# Patient Record
Sex: MALE | Race: WHITE | NOT HISPANIC OR LATINO | Employment: STUDENT | URBAN - NONMETROPOLITAN AREA
[De-identification: names, ages, dates, MRNs, and addresses within clinical notes are randomized per-mention and may not be internally consistent; named-entity substitution may affect disease eponyms.]

---

## 2017-02-28 ENCOUNTER — OFFICE VISIT (OUTPATIENT)
Dept: GASTROENTEROLOGY | Facility: CLINIC | Age: 22
End: 2017-02-28

## 2017-02-28 VITALS
HEART RATE: 60 BPM | DIASTOLIC BLOOD PRESSURE: 98 MMHG | SYSTOLIC BLOOD PRESSURE: 138 MMHG | HEIGHT: 66 IN | BODY MASS INDEX: 31.5 KG/M2 | OXYGEN SATURATION: 99 % | WEIGHT: 196 LBS | TEMPERATURE: 97.1 F

## 2017-02-28 DIAGNOSIS — R79.89 ABNORMAL LFTS: Primary | ICD-10-CM

## 2017-02-28 PROCEDURE — 99203 OFFICE O/P NEW LOW 30 MIN: CPT | Performed by: NURSE PRACTITIONER

## 2017-02-28 RX ORDER — LISINOPRIL 20 MG/1
20 TABLET ORAL 2 TIMES DAILY
COMMUNITY
End: 2018-01-23 | Stop reason: SDUPTHER

## 2017-02-28 RX ORDER — METOPROLOL TARTRATE 50 MG/1
100 TABLET, FILM COATED ORAL 2 TIMES DAILY
COMMUNITY
End: 2018-01-23 | Stop reason: SDUPTHER

## 2017-02-28 RX ORDER — HYDROXYZINE HYDROCHLORIDE 25 MG/1
25 TABLET, FILM COATED ORAL NIGHTLY
COMMUNITY
End: 2017-06-12

## 2017-02-28 RX ORDER — AMLODIPINE BESYLATE 10 MG/1
10 TABLET ORAL DAILY
COMMUNITY
End: 2018-01-23 | Stop reason: SDUPTHER

## 2017-02-28 RX ORDER — PAROXETINE 10 MG/1
10 TABLET, FILM COATED ORAL EVERY MORNING
COMMUNITY
End: 2018-01-23 | Stop reason: SDUPTHER

## 2017-02-28 NOTE — PROGRESS NOTES
[facility] GASTROENTEROLOGY - OFFICE NOTE    2/28/2017    Mike Rowan   1995    Chief Complaint   Patient presents with   • GI Problem     elevated LFTs       pcp  Guera elizabeth      HISTORY OF PRESENT ILLNESS    Mike Rowan is a 21 y.o. male presents  with abnormal LFTs.  He has had abnormal LFTs for the last 2-3 years.  If that they have not normalized in the last 3 years.  He has been seen by Dr. Schuster is well as Dr. Stern and states has not been told by his liver function tests are elevated.  He has some fatigue.  denies abdominal pain.  Denies nausea or vomiting.  Denies fevers.  Denies unintentional weight loss.  Denies any history of jaundice.  Only occasional alcohol,  never been a heavy alcohol drinker.  Denies history of liver biopsy.  No over-the-counter medicines or herbs.  H he has been on metoprolol for several years, lisinopril for 2 years, Atarax for the last year and a half, Norvasc for the last year, and  Paxil for the last year.      Most recent labs 12/07/2016, , , albumin normal, alkaline phosphatase normal, bilirubin normal, BUN 12, creatinine 1.11, white blood cell count 6.2,  hemoglobin 13.2,  platelets 272, GGT 70, hepatitis panel negative.  Imaging studies, ultrasound of the abdomen March 2015 impression was coarsened liver parenchyma with a nonspecific complex area along the gallbladder margin, could have fatty liver.  CT scan abdomen and pelvis with and without contrast April 2015 at [facility], this was for right lower quadrant abdominal pain, imaging through the liver demonstrated calcified granuloma in the right daniela liver, diffuse low attenuation is seen throughout the liver with focal fatty sparing adjacent to the gallbladder fossa, there was no focal hepatic lesion and the gallbladder was normal, nonspecific react mesenteric lymph nodes in the right lower quadrant and central mesentery which can be seen with mesenteric adenitis.  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  Reviewed office note from May 2015 which time he saw Dr. Schuster, Dr. Schuster did order some liver serologies and was considering a referral to a physician who does biliary manometry as well as ERCP, question if he had some biliary dyskinesia type II.  The patient denies ever seeing a specialist for biliary manometry or ERCP.    Past Medical History   Diagnosis Date   • Acid reflux    • Asthma    • Hypertension        History reviewed. No pertinent past surgical history.    Outpatient Prescriptions Marked as Taking for the 2/28/17 encounter (Office Visit) with ERASMO Crowe   Medication Sig Dispense Refill   • amLODIPine (NORVASC) 10 MG tablet Take 10 mg by mouth Daily.     • hydrOXYzine (ATARAX) 25 MG tablet Take 25 mg by mouth Every Night.     • lisinopril (PRINIVIL,ZESTRIL) 20 MG tablet Take 20 mg by mouth 2 (Two) Times a Day.     • metoprolol tartrate (LOPRESSOR) 50 MG tablet Take 50 mg by mouth 2 (Two) Times a Day.     • PARoxetine (PAXIL) 10 MG tablet Take 10 mg by mouth Every Morning.         Allergies   Allergen Reactions   • Pertussis Vaccines        Social History     Social History   • Marital status: Single     Spouse name: N/A   • Number of children: N/A   • Years of education: N/A     Occupational History   • Not on file.     Social History Main Topics   • Smoking status: Never Smoker   • Smokeless tobacco: Not on file   • Alcohol use Yes      Comment: occ   • Drug use: No   • Sexual activity: Not on file     Other Topics Concern   • Not on file     Social History Narrative       History reviewed. No pertinent family history.    Review of Systems   Constitutional: Negative for appetite change, chills, fatigue, fever and unexpected weight change.   HENT: Negative for sore throat and trouble swallowing.    Eyes: Negative for pain and visual disturbance.   Respiratory: Negative for cough, chest  "tightness, shortness of breath and wheezing.    Cardiovascular: Negative for chest pain and palpitations.   Gastrointestinal: Negative for abdominal distention, abdominal pain, anal bleeding, blood in stool, constipation, diarrhea, nausea, rectal pain and vomiting.        As mentioned in hpi   Endocrine: Negative for cold intolerance and heat intolerance.   Genitourinary: Negative for difficulty urinating, dysuria and hematuria.   Musculoskeletal: Negative for arthralgias and back pain.   Skin: Negative for color change and rash.   Neurological: Negative for dizziness, tremors, seizures, syncope, light-headedness and headaches.   Hematological: Negative for adenopathy. Does not bruise/bleed easily.   Psychiatric/Behavioral: Negative for confusion. The patient is not nervous/anxious.        Vitals:    02/28/17 0847   BP: 138/98   BP Location: Left arm   Patient Position: Sitting   Cuff Size: Adult   Pulse: 60   Temp: 97.1 °F (36.2 °C)   SpO2: 99%   Weight: 196 lb (88.9 kg)   Height: 66\" (167.6 cm)      Body mass index is 31.64 kg/(m^2).    Physical Exam   Constitutional: He is oriented to person, place, and time. He appears well-developed and well-nourished. No distress.   HENT:   Head: Normocephalic and atraumatic.   Mouth/Throat: Oropharynx is clear and moist.   Eyes: Pupils are equal, round, and reactive to light. No scleral icterus.   Neck: Normal range of motion. Neck supple. No JVD present. No tracheal deviation present.   Cardiovascular: Normal rate, regular rhythm, normal heart sounds and intact distal pulses.  Exam reveals no gallop and no friction rub.    No murmur heard.  Pulmonary/Chest: Effort normal and breath sounds normal. No stridor. No respiratory distress. He has no wheezes. He has no rales.   Abdominal: Soft. Bowel sounds are normal. He exhibits no distension and no mass. There is no tenderness. There is no rebound and no guarding.   Musculoskeletal: Normal range of motion. He exhibits no edema or " deformity.   Neurological: He is alert and oriented to person, place, and time. No cranial nerve deficit.   Skin: Skin is warm and dry. No rash noted.   Psychiatric: He has a normal mood and affect. His behavior is normal.   Vitals reviewed.      No results found for this or any previous visit.        ASSESSMENT AND PLAN    Mike was seen today for gi problem.    Diagnoses and all orders for this visit:    Abnormal LFTs  -     Cancel: Alpha - 1 - Antitrypsin  -     CBC & Differential  -     Comprehensive Metabolic Panel  -     Protime-INR  -     US Liver  -     Anti-Smooth Muscle Antibody Titer  -     Mitochondrial Antibodies, M2  -     PEPPER  -     Ferritin  -     Iron Profile  -     Alpha - 1 - Antitrypsin  -     Ceruloplasmin  -     Lipid Panel  -     Hepatic Function Panel; Future  -     Cancel: Hepatic Function Panel      Differential diagnoses discussed.  We will recheck an ultrasound of the liver.  We will check liver serologies and lipid panel.  Recommend healthy diet, keep weight under control, exercise.  We will see him back in the office in 4-6 weeks with recheck of liver function tests prior to that office visit.  We will also request records from Dr. Stern.         Body mass index is 31.64 kg/(m^2).     Return in about 6 weeks (around 4/11/2017).      *    ERASMO Graves    EMR Dragon/transcription disclaimer:  Much of this encounter note is electronic transcription/translation of spoken language to printed text.  The electronic translation of spoken language may be erroneous, or at times, nonsensical words or phrases may be inadvertently transcribed.  Although I have reviewed the note for such errors, some may still exist.    No results found for this or any previous visit.

## 2017-03-02 ENCOUNTER — TELEPHONE (OUTPATIENT)
Dept: GASTROENTEROLOGY | Facility: CLINIC | Age: 22
End: 2017-03-02

## 2017-03-02 ENCOUNTER — LAB (OUTPATIENT)
Dept: LAB | Facility: HOSPITAL | Age: 22
End: 2017-03-02

## 2017-03-02 ENCOUNTER — HOSPITAL ENCOUNTER (OUTPATIENT)
Dept: ULTRASOUND IMAGING | Facility: HOSPITAL | Age: 22
Discharge: HOME | End: 2017-03-02
Admitting: NURSE PRACTITIONER

## 2017-03-02 DIAGNOSIS — R79.89 ABNORMAL LFTS: ICD-10-CM

## 2017-03-02 LAB
ALBUMIN SERPL-MCNC: 4.5 G/DL (ref 3.5–5)
ALBUMIN/GLOB SERPL: 1.2 G/DL (ref 1.1–2.5)
ALP SERPL-CCNC: 96 U/L (ref 24–120)
ALT SERPL W P-5'-P-CCNC: 369 U/L (ref 0–54)
ANION GAP SERPL CALCULATED.3IONS-SCNC: 11 MMOL/L (ref 4–13)
ARTICHOKE IGE QN: 177 MG/DL (ref 0–99)
AST SERPL-CCNC: 142 U/L (ref 7–45)
BASOPHILS # BLD AUTO: 0.03 10*3/MM3 (ref 0–0.2)
BASOPHILS NFR BLD AUTO: 0.5 % (ref 0–2)
BILIRUB CONJ SERPL-MCNC: 0 MG/DL (ref 0–0.3)
BILIRUB SERPL-MCNC: 0.5 MG/DL (ref 0.1–1)
BUN BLD-MCNC: 10 MG/DL (ref 5–21)
BUN/CREAT SERPL: 9.6 (ref 7–25)
CALCIUM SPEC-SCNC: 9.6 MG/DL (ref 8.4–10.4)
CHLORIDE SERPL-SCNC: 102 MMOL/L (ref 98–110)
CHOLEST SERPL-MCNC: 244 MG/DL (ref 130–200)
CO2 SERPL-SCNC: 28 MMOL/L (ref 24–31)
CREAT BLD-MCNC: 1.04 MG/DL (ref 0.5–1.4)
DEPRECATED RDW RBC AUTO: 37.3 FL (ref 40–54)
EOSINOPHIL # BLD AUTO: 0.09 10*3/MM3 (ref 0–0.7)
EOSINOPHIL NFR BLD AUTO: 1.6 % (ref 0–4)
ERYTHROCYTE [DISTWIDTH] IN BLOOD BY AUTOMATED COUNT: 13.4 % (ref 12–15)
FERRITIN SERPL-MCNC: 121 NG/ML (ref 17.9–464)
GFR SERPL CREATININE-BSD FRML MDRD: 89 ML/MIN/1.73
GLOBULIN UR ELPH-MCNC: 3.7 GM/DL
GLUCOSE BLD-MCNC: 125 MG/DL (ref 70–100)
HCT VFR BLD AUTO: 42.8 % (ref 40–52)
HDLC SERPL-MCNC: 53 MG/DL
HGB BLD-MCNC: 14.3 G/DL (ref 14–18)
IMM GRANULOCYTES # BLD: 0.08 10*3/MM3 (ref 0–0.03)
IMM GRANULOCYTES NFR BLD: 1.4 % (ref 0–5)
INR PPP: 0.86 (ref 0.91–1.09)
IRON 24H UR-MRATE: 83 MCG/DL (ref 42–180)
IRON SATN MFR SERPL: 25 % (ref 20–45)
LDLC/HDLC SERPL: 3.03 {RATIO}
LYMPHOCYTES # BLD AUTO: 2.88 10*3/MM3 (ref 0.72–4.86)
LYMPHOCYTES NFR BLD AUTO: 49.7 % (ref 15–45)
MCH RBC QN AUTO: 25.7 PG (ref 28–32)
MCHC RBC AUTO-ENTMCNC: 33.4 G/DL (ref 33–36)
MCV RBC AUTO: 76.8 FL (ref 82–95)
MONOCYTES # BLD AUTO: 0.39 10*3/MM3 (ref 0.19–1.3)
MONOCYTES NFR BLD AUTO: 6.7 % (ref 4–12)
NEUTROPHILS # BLD AUTO: 2.32 10*3/MM3 (ref 1.87–8.4)
NEUTROPHILS NFR BLD AUTO: 40.1 % (ref 39–78)
PLATELET # BLD AUTO: 253 10*3/MM3 (ref 130–400)
PMV BLD AUTO: 11.3 FL (ref 6–12)
POTASSIUM BLD-SCNC: 3.9 MMOL/L (ref 3.5–5.3)
PROT SERPL-MCNC: 8.2 G/DL (ref 6.3–8.7)
PROTHROMBIN TIME: 12 SECONDS (ref 11.9–14.6)
RBC # BLD AUTO: 5.57 10*6/MM3 (ref 4.8–5.9)
SODIUM BLD-SCNC: 141 MMOL/L (ref 135–145)
TIBC SERPL-MCNC: 327 MCG/DL (ref 225–420)
TRIGL SERPL-MCNC: 152 MG/DL (ref 0–149)
WBC NRBC COR # BLD: 5.79 10*3/MM3 (ref 4.8–10.8)

## 2017-03-02 PROCEDURE — 80053 COMPREHEN METABOLIC PANEL: CPT | Performed by: NURSE PRACTITIONER

## 2017-03-02 PROCEDURE — 85025 COMPLETE CBC W/AUTO DIFF WBC: CPT | Performed by: NURSE PRACTITIONER

## 2017-03-02 PROCEDURE — 86038 ANTINUCLEAR ANTIBODIES: CPT | Performed by: NURSE PRACTITIONER

## 2017-03-02 PROCEDURE — 82728 ASSAY OF FERRITIN: CPT | Performed by: NURSE PRACTITIONER

## 2017-03-02 PROCEDURE — 83516 IMMUNOASSAY NONANTIBODY: CPT | Performed by: NURSE PRACTITIONER

## 2017-03-02 PROCEDURE — 83540 ASSAY OF IRON: CPT | Performed by: NURSE PRACTITIONER

## 2017-03-02 PROCEDURE — 36415 COLL VENOUS BLD VENIPUNCTURE: CPT

## 2017-03-02 PROCEDURE — 85610 PROTHROMBIN TIME: CPT | Performed by: NURSE PRACTITIONER

## 2017-03-02 PROCEDURE — 82390 ASSAY OF CERULOPLASMIN: CPT | Performed by: NURSE PRACTITIONER

## 2017-03-02 PROCEDURE — 82103 ALPHA-1-ANTITRYPSIN TOTAL: CPT | Performed by: NURSE PRACTITIONER

## 2017-03-02 PROCEDURE — 76705 ECHO EXAM OF ABDOMEN: CPT

## 2017-03-02 PROCEDURE — 80061 LIPID PANEL: CPT | Performed by: NURSE PRACTITIONER

## 2017-03-02 PROCEDURE — 83550 IRON BINDING TEST: CPT | Performed by: NURSE PRACTITIONER

## 2017-03-02 PROCEDURE — 82248 BILIRUBIN DIRECT: CPT | Performed by: NURSE PRACTITIONER

## 2017-03-02 NOTE — TELEPHONE ENCOUNTER
Let him know ultrasound shows fatty liver, which is just what sounds like fat deposits in the liver.  Once again recommend weight loss 1 pound a week to ideal body weight, exercise, healthy diet, keep blood sugars and cholesterol under control.

## 2017-03-03 LAB
A1AT SERPL-MCNC: 117 MG/DL (ref 90–200)
ACTIN IGG SERPL-ACNC: 9 UNITS (ref 0–19)
ANA SER QL: NEGATIVE
CERULOPLASMIN SERPL-MCNC: 21.3 MG/DL (ref 16–31)
DEPRECATED MITOCHONDRIA M2 IGG SER-ACNC: <20 UNITS (ref 0–20)

## 2017-03-06 ENCOUNTER — TELEPHONE (OUTPATIENT)
Dept: GASTROENTEROLOGY | Facility: CLINIC | Age: 22
End: 2017-03-06

## 2017-03-06 DIAGNOSIS — R79.89 ABNORMAL LFTS: Primary | ICD-10-CM

## 2017-04-20 ENCOUNTER — LAB (OUTPATIENT)
Dept: LAB | Facility: HOSPITAL | Age: 22
End: 2017-04-20

## 2017-04-20 ENCOUNTER — OFFICE VISIT (OUTPATIENT)
Dept: GASTROENTEROLOGY | Facility: CLINIC | Age: 22
End: 2017-04-20

## 2017-04-20 VITALS
TEMPERATURE: 96.7 F | BODY MASS INDEX: 31.66 KG/M2 | SYSTOLIC BLOOD PRESSURE: 140 MMHG | OXYGEN SATURATION: 99 % | DIASTOLIC BLOOD PRESSURE: 92 MMHG | WEIGHT: 197 LBS | HEART RATE: 54 BPM | HEIGHT: 66 IN

## 2017-04-20 DIAGNOSIS — K76.0 FATTY LIVER: ICD-10-CM

## 2017-04-20 DIAGNOSIS — R79.89 ABNORMAL LFTS: ICD-10-CM

## 2017-04-20 DIAGNOSIS — R79.89 ABNORMAL LFTS: Primary | ICD-10-CM

## 2017-04-20 DIAGNOSIS — R10.11 RIGHT UPPER QUADRANT ABDOMINAL PAIN: ICD-10-CM

## 2017-04-20 LAB
ALBUMIN SERPL-MCNC: 4.5 G/DL (ref 3.5–5)
ALP SERPL-CCNC: 70 U/L (ref 24–120)
ALT SERPL W P-5'-P-CCNC: 322 U/L (ref 0–54)
AST SERPL-CCNC: 127 U/L (ref 7–45)
BILIRUB CONJ SERPL-MCNC: 0 MG/DL (ref 0–0.3)
BILIRUB INDIRECT SERPL-MCNC: 0.1 MG/DL (ref 0–1.1)
BILIRUB SERPL-MCNC: 0.4 MG/DL (ref 0.1–1)
PROT SERPL-MCNC: 7.9 G/DL (ref 6.3–8.7)

## 2017-04-20 PROCEDURE — 36415 COLL VENOUS BLD VENIPUNCTURE: CPT | Performed by: NURSE PRACTITIONER

## 2017-04-20 PROCEDURE — 80076 HEPATIC FUNCTION PANEL: CPT | Performed by: NURSE PRACTITIONER

## 2017-04-20 PROCEDURE — 99214 OFFICE O/P EST MOD 30 MIN: CPT | Performed by: NURSE PRACTITIONER

## 2017-04-20 NOTE — PROGRESS NOTES
"[facility] GASTROENTEROLOGY - OFFICE NOTE    4/20/2017    Mike Rowan   1995    Primary Physician: Anastacio Kim MD    Chief Complaint   Patient presents with   • Follow-up   abnormal lft       pcp   Guera chawla np with dr vee Kim MD        HISTORY OF PRESENT ILLNESS    Mike Rowan is a 21 y.o. male presents for f/u abnormal lft's. As of 3/2/17  lft's still elevated , these today and actually is ALT is down to 322, AST is down to 127, bilirubin and alkaline phosphatase both normal..  Second 2017, ALT was 369, , bilirubin and alkaline phosphatase normal.  . . Platelets have been normal.  Ordered liver 6 pack and all unremarkable. Cholesterol  and triglycerides high.  I repeated ultrasound the liver done 03/02/2017, impression was fatty liver disease, the gallbladder was unremarkable, bile ducts revealed no intra-or extrahepatic ductal dilation.  Since his last office visit he has been experiencing some right upper quadrant abdominal pain intermittently.  He describes as a sharp pain that greasy foods will trigger.  \"Eating healthy will help\".  He has used no medications for this pain.  In the past he has been on Protonix.  He denies any history of peptic ulcer disease he does take aspirin about 2-3 times a week for headache.  He denies black stool.  States that he had an EGD 2015 by Dr. Stern, and was ok, was told had acid reflux and was put on protonix.  He has been off Protonix at and is unsure how long.  He denies starting on any new medications.      Ov  2/8/17 Mike Rowan is a 21 y.o. male presents with abnormal LFTs. He has had abnormal LFTs for the last 2-3 years. If that they have not normalized in the last 3 years. He has been seen by Dr. Schuster is well as Dr. Stern and states has not been told by his liver function tests are elevated. He has some fatigue.  denies abdominal pain. Denies nausea or vomiting. Denies fevers. Denies unintentional weight loss. " Denies any history of jaundice. Only occasional alcohol, never been a heavy alcohol drinker. Denies history of liver biopsy. No over-the-counter medicines or herbs. H he has been on metoprolol for several years, lisinopril for 2 years, Atarax for the last year and a half, Norvasc for the last year, and  Paxil for the last year.      Most recent labs 12/07/2016, , , albumin normal, alkaline phosphatase normal, bilirubin normal, BUN 12, creatinine 1.11, white blood cell count 6.2,  hemoglobin 13.2, platelets 272, GGT 70, hepatitis panel negative. Imaging studies, ultrasound of the abdomen March 2015 impression was coarsened liver parenchyma with a nonspecific complex area along the gallbladder margin, could have fatty liver. CT scan abdomen and pelvis with and without contrast April 2015 at [facility], this was for right lower quadrant abdominal pain, imaging through the liver demonstrated calcified granuloma in the right daniela liver, diffuse low attenuation is seen throughout the liver with focal fatty sparing adjacent to the gallbladder fossa, there was no focal hepatic lesion and the gallbladder was normal, nonspecific react mesenteric lymph nodes in the right lower quadrant and central mesentery which can be seen with mesenteric adenitis. 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  Reviewed office note from May 2015 which time he saw Dr. Schuster, Dr. Schuster did order some liver serologies and was considering a referral to a physician who does biliary manometry as well as ERCP, question if he had some biliary dyskinesia type II. The patient denies ever seeing a specialist for biliary manometry or ERCP.    Past Medical History:   Diagnosis Date   • Acid reflux    • Asthma    • Hypertension        History reviewed. No pertinent surgical history.    Outpatient Prescriptions Marked as Taking for the 4/20/17 encounter (Office Visit)  with ERASMO Crowe   Medication Sig Dispense Refill   • amLODIPine (NORVASC) 10 MG tablet Take 10 mg by mouth Daily.     • lisinopril (PRINIVIL,ZESTRIL) 20 MG tablet Take 20 mg by mouth 2 (Two) Times a Day.     • metoprolol tartrate (LOPRESSOR) 50 MG tablet Take 50 mg by mouth 2 (Two) Times a Day.     • PARoxetine (PAXIL) 10 MG tablet Take 10 mg by mouth Every Morning.         Allergies   Allergen Reactions   • Pertussis Vaccines        Social History     Social History   • Marital status: Single     Spouse name: N/A   • Number of children: N/A   • Years of education: N/A     Occupational History   • Not on file.     Social History Main Topics   • Smoking status: Never Smoker   • Smokeless tobacco: Not on file   • Alcohol use Yes      Comment: occ   • Drug use: No   • Sexual activity: Not on file     Other Topics Concern   • Not on file     Social History Narrative       Family History   Problem Relation Age of Onset   • Cirrhosis Father      non alcoholic    • Colon cancer Maternal Grandmother    • Cancer Maternal Grandmother      pancreatic       Review of Systems   Constitutional: Positive for fatigue (x 2 y chronic). Negative for appetite change, chills, fever and unexpected weight change.   Eyes: Negative for visual disturbance.   Respiratory: Negative for cough, chest tightness, shortness of breath and wheezing.    Cardiovascular: Negative for chest pain and palpitations.   Gastrointestinal: Positive for abdominal pain. Negative for abdominal distention, anal bleeding, blood in stool, constipation, diarrhea, nausea, rectal pain and vomiting.        As mentioned in hpi   Genitourinary: Negative for difficulty urinating, dysuria and hematuria.   Musculoskeletal: Positive for back pain (chronic, pars defect per pt ). Negative for arthralgias.   Skin: Negative for color change and rash.        No itching or yellow skin    Neurological: Positive for dizziness (occasional) and headaches (occasional. ).  "Negative for tremors, seizures, syncope and light-headedness.   Hematological: Negative for adenopathy. Does not bruise/bleed easily.   Psychiatric/Behavioral: Negative for confusion. The patient is nervous/anxious (anxiety , stable , on paxil. ).        Vitals:    04/20/17 0935   BP: 140/92   BP Location: Left arm   Patient Position: Sitting   Cuff Size: Adult   Pulse: 54   Temp: 96.7 °F (35.9 °C)   SpO2: 99%   Weight: 197 lb (89.4 kg)   Height: 66\" (167.6 cm)      Body mass index is 31.8 kg/(m^2).    Physical Exam   Constitutional: He is oriented to person, place, and time. He appears well-developed and well-nourished. No distress.   HENT:   Head: Normocephalic and atraumatic.   Mouth/Throat: Oropharynx is clear and moist.   Eyes: Pupils are equal, round, and reactive to light. No scleral icterus.   Neck: Normal range of motion. Neck supple. No JVD present. No tracheal deviation present.   Cardiovascular: Normal rate, regular rhythm, normal heart sounds and intact distal pulses.  Exam reveals no gallop and no friction rub.    No murmur heard.  Pulmonary/Chest: Effort normal. No stridor. No respiratory distress. He has no wheezes. He has no rales.   Abdominal: Soft. Bowel sounds are normal. He exhibits no distension and no mass. There is tenderness (mild ruq  tenderness ). There is no rebound and no guarding.   Musculoskeletal: Normal range of motion. He exhibits no edema or deformity.   Neurological: He is alert and oriented to person, place, and time.   Skin: Skin is warm and dry. No rash noted.   Psychiatric: He has a normal mood and affect. His behavior is normal.   Vitals reviewed.      Results for orders placed or performed in visit on 04/20/17   Hepatic Function Panel   Result Value Ref Range    Total Protein 7.9 6.3 - 8.7 g/dL    Albumin 4.50 3.50 - 5.00 g/dL    ALT (SGPT) 322 (H) 0 - 54 U/L    AST (SGOT) 127 (H) 7 - 45 U/L    Alkaline Phosphatase 70 24 - 120 U/L    Total Bilirubin 0.4 0.1 - 1.0 mg/dL    " Bilirubin, Direct 0.0 0.0 - 0.3 mg/dL    Bilirubin, Indirect 0.1 0.0 - 1.1 mg/dL           ASSESSMENT AND PLAN    Mike was seen today for follow-up.    Diagnoses and all orders for this visit:    Abnormal LFTs  -     Hepatic Function Panel    Right upper quadrant abdominal pain    Fatty liver    Other orders  -     pantoprazole (PROTONIX) 40 MG EC tablet; Take 1 tablet by mouth Daily.     once again did discuss fatty liver, recommend gradual weight loss 1 pound a week to ideal body , exercise, healthy diet, keep cholesterol under control ,   No alcohol.  I am going to repeat LFTs today.  He has been having right upper quadrant abdominal pain since his last office visit in February, he has been off Protonix, I recommended he get back on Protonix 1 pill daily.  We did review all liver serologies that I ordered recently which were all unremarkable.  However, his liver function tests continue to be elevated.  Question if this is just due to fatty liver.  will discuss case with Dr. Martinez.       Body mass index is 31.8 kg/(m^2).           ERASMO Graves Dragon/transcription disclaimer:  Much of this encounter note is electronic transcription/translation of spoken language to printed text.  The electronic translation of spoken language may be erroneous, or at times, nonsensical words or phrases may be inadvertently transcribed.  Although I have reviewed the note for such errors, some may still exist.    Results for orders placed or performed in visit on 04/20/17   Hepatic Function Panel   Result Value Ref Range    Total Protein 7.9 6.3 - 8.7 g/dL    Albumin 4.50 3.50 - 5.00 g/dL    ALT (SGPT) 322 (H) 0 - 54 U/L    AST (SGOT) 127 (H) 7 - 45 U/L    Alkaline Phosphatase 70 24 - 120 U/L    Total Bilirubin 0.4 0.1 - 1.0 mg/dL    Bilirubin, Direct 0.0 0.0 - 0.3 mg/dL    Bilirubin, Indirect 0.1 0.0 - 1.1 mg/dL       I called and spoke with his mother today, let her know that I did discuss this case with Dr. Martinez  at this point recommends liver biopsy if he is agreeable.  I briefly did discuss risks can occur with liver biopsy.  She is going to speak with him and get back with me on Monday to let me know what they decide to do.  She also tells me that he is not taking Atarax which was listed on his home medications.  He had been having some right upper quadrant pain as well and underwent a place him back on Protonix daily.  Recommend ER if severe abdominal pain.

## 2017-04-21 ENCOUNTER — TELEPHONE (OUTPATIENT)
Dept: GASTROENTEROLOGY | Facility: CLINIC | Age: 22
End: 2017-04-21

## 2017-04-21 RX ORDER — PANTOPRAZOLE SODIUM 40 MG/1
40 TABLET, DELAYED RELEASE ORAL DAILY
Qty: 30 TABLET | Refills: 11 | Status: SHIPPED | OUTPATIENT
Start: 2017-04-21 | End: 2018-07-24 | Stop reason: SDUPTHER

## 2017-06-12 ENCOUNTER — OFFICE VISIT (OUTPATIENT)
Dept: GASTROENTEROLOGY | Facility: CLINIC | Age: 22
End: 2017-06-12

## 2017-06-12 VITALS
HEART RATE: 69 BPM | TEMPERATURE: 97.3 F | HEIGHT: 66 IN | DIASTOLIC BLOOD PRESSURE: 110 MMHG | WEIGHT: 200 LBS | SYSTOLIC BLOOD PRESSURE: 184 MMHG | OXYGEN SATURATION: 100 % | BODY MASS INDEX: 32.14 KG/M2

## 2017-06-12 DIAGNOSIS — K58.9 IRRITABLE BOWEL SYNDROME WITHOUT DIARRHEA: ICD-10-CM

## 2017-06-12 DIAGNOSIS — R10.11 RIGHT UPPER QUADRANT ABDOMINAL PAIN: ICD-10-CM

## 2017-06-12 DIAGNOSIS — R79.89 ABNORMAL LFTS: Primary | ICD-10-CM

## 2017-06-12 PROCEDURE — 99214 OFFICE O/P EST MOD 30 MIN: CPT | Performed by: INTERNAL MEDICINE

## 2017-06-12 RX ORDER — DICYCLOMINE HYDROCHLORIDE 10 MG/1
10 CAPSULE ORAL
Qty: 120 CAPSULE | Refills: 11 | Status: SHIPPED | OUTPATIENT
Start: 2017-06-12 | End: 2018-01-04

## 2017-06-12 NOTE — PROGRESS NOTES
[facility]    Chief Complaint   Patient presents with   • Follow-up       Subjective     HPI    Mike Rowan is a 21 y.o. male who presents with a chief complaint of  Abnormal LFTs.  He has seen Martha last 2 office visit.  Prior to that he had seen Dr. Schustre and a GI physician in [place].  He has chronic right upper quadrant discomfort.  He has intermittent abdominal bloating.  He has intermittent nausea.  Denies diarrhea or constipation.  Extensive evaluation with ultrasound and CTs and lab investigations including liver serologies all been unremarkable other then elevated transaminases and fatty liver on ultrasound.  He is on daily PPI and still has his discomfort.  He has had prior endoscopies.  Nothing recently.  These symptoms are not new and have been occurring chronically.  He comes in today with his mother.    His mom and him both tell me he is under a lot of stress.  His parents live over 4 hours away but he is living locally to help care for his grandparents who are in their 70s.  They are ill and needs is helped.  He is hoping to resume college and shone the this fall and then transferred to [place] as was his original plan.    When he last spoke with our office in [facility] it was discussed with him about pursuing a liver biopsy.  They are willing to proceed with liver biopsy would like to talk about it more today.       Past Medical History:   Diagnosis Date   • Acid reflux    • Asthma    • Hypertension        History reviewed. No pertinent surgical history.      Current Outpatient Prescriptions:   •  amLODIPine (NORVASC) 10 MG tablet, Take 10 mg by mouth Daily., Disp: , Rfl:   •  lisinopril (PRINIVIL,ZESTRIL) 20 MG tablet, Take 20 mg by mouth 2 (Two) Times a Day., Disp: , Rfl:   •  metoprolol tartrate (LOPRESSOR) 50 MG tablet, Take 50 mg by mouth 2 (Two) Times a Day., Disp: , Rfl:   •  pantoprazole (PROTONIX) 40 MG EC tablet, Take 1 tablet by mouth Daily., Disp: 30 tablet, Rfl:  11  •  PARoxetine (PAXIL) 10 MG tablet, Take 10 mg by mouth Every Morning., Disp: , Rfl:   •  dicyclomine (BENTYL) 10 MG capsule, Take 1 capsule by mouth 4 (Four) Times a Day Before Meals & at Bedtime., Disp: 120 capsule, Rfl: 11    Allergies   Allergen Reactions   • Pertussis Vaccines        Social History     Social History   • Marital status: Single     Spouse name: N/A   • Number of children: N/A   • Years of education: N/A     Occupational History   • Not on file.     Social History Main Topics   • Smoking status: Never Smoker   • Smokeless tobacco: Never Used   • Alcohol use Yes      Comment: occ   • Drug use: No   • Sexual activity: Not on file     Other Topics Concern   • Not on file     Social History Narrative       Family History   Problem Relation Age of Onset   • Cirrhosis Father      non alcoholic    • Colon cancer Maternal Grandmother    • Cancer Maternal Grandmother      pancreatic       Review of Systems   Constitutional: Negative for chills and fever.   Respiratory: Negative for apnea and chest tightness.    Cardiovascular: Negative for chest pain, palpitations and leg swelling.   Gastrointestinal: Positive for abdominal distention, abdominal pain and nausea. Negative for anal bleeding, blood in stool, constipation, diarrhea, rectal pain and vomiting.   Psychiatric/Behavioral: Negative for confusion. The patient is not hyperactive.          Objective     Vitals:    06/12/17 1407   BP: (!) 184/110   Pulse: 69   Temp: 97.3 °F (36.3 °C)   SpO2: 100%       Physical Exam   Constitutional: He appears well-developed and well-nourished.   Eyes: Conjunctivae and EOM are normal.   Cardiovascular: Normal rate, regular rhythm and normal heart sounds.    Pulmonary/Chest: Effort normal and breath sounds normal.   Abdominal: Soft. Bowel sounds are normal. He exhibits no distension and no mass. There is no tenderness. There is no rebound and no guarding.   Musculoskeletal: He exhibits no edema.   Skin: Skin is  warm.         Assessment/Plan      Mike was seen today for follow-up.    Diagnoses and all orders for this visit:    Abnormal LFTs  -     Alpha - 1 - Antitrypsin; Future  -     CBC & Differential; Future  -     Protime-INR; Future  -     Comprehensive Metabolic Panel; Future  -     US guided liver biopsy; Future    Right upper quadrant abdominal pain    Irritable bowel syndrome without diarrhea    Other orders  -     dicyclomine (BENTYL) 10 MG capsule; Take 1 capsule by mouth 4 (Four) Times a Day Before Meals & at Bedtime.      First, regarding the elevated LFTs this is consistent with fatty liver disease.  We discussed the difference between [place] fatty liver and [place].  His father currently has cirrhosis secondary to national he does understand it.  He does have underlying metabolic syndrome with hypertension obesity and elevated lipids.  This puts him at increased risk for fatty liver and subsequently chronic liver disease such as cirrhosis. I discussed how fatty liver can lead to cirrhosis, disability and pre-mature death.  How it also can be a sign of increased risk for cardiovascular disease.  I discussed the importance of getting rid of fat in the liver by controlling lipids and glucose, avoiding etoh helps, and gradual weight loss to ideal body weight is very important.     We discussed liver biopsy at length to help determine if anything else is going on besides fatty liver and to determine status of his liver disease such as fibrotic level and activity level.  We discussed through his blood test that can help pinpoint this but they are not as accurate as a liver biopsy and they are misleading at times.  We can pursue this route if desired.  The risk of liver biopsy as discussed includes risk of perforation that may require surgery risk of bleeding that may require transfusions or surgery and there is at least one out of 10,000 chance of death.  We discussed pain and discomfort if a bile leak should occur  and what would be expected if such would occur.  Many questions were answered.  Another alternative would be simply to follow the LFTs and he works on weight loss.  The patient informed me that he wishes to pursue liver biopsy in his mother is in agreement.  They wish to schedule this when she can come back down and visit him which would be the end of July.  I think that is reasonable.  They also requested that we call him with the results instead of a follow-up office visit as we discussed possible results.  If there is something new on the liver biopsy either fatty liver then they would be agreeable to come back to an office visit otherwise they would just want to know the activity and fibrotic level.  Then they would follow-up in a year.  Again I think this is reasonable.  We will therefore schedule liver biopsy with us to a  results and further recommendations.  Then I will see him back in the office in a year.  In the meantime he will work strongly on correcting his weight and his hyperlipidemia.  We discussed the importance of healthy diet healthy ideal body weight and healthy lifestyle.    Second, he does have intermittent abdominal discomfort associated with bloating.  The symptoms are chronic.  His right upper quadrant discomfort may be due to chronic distention of his liver from fatty liver.  Treatment of that would be weight loss.  I also feel that he has underlying functional bowel.  I suggested he try an over-the-counter probiotic for one month daily dose.  I also suggested a trial when necessary Bentyl I discussed how to use it.  We discussed the side effects.  He was in agreement.  Continue ongoing management by primary care provider and other specialists.     EMR Dragon/transcription disclaimer:  Much of this encounter note is electronic transcription/translation of spoken language to printed text.  The electronic translation of spoken language may be erroneous, or at times, nonsensical words  or phrases may be inadvertently transcribed.  Although I have reviewed the note for such errors, some may still exist.    Basim Martinez MD  3:00 PM  06/12/17

## 2017-06-14 ENCOUNTER — HOSPITAL ENCOUNTER (OUTPATIENT)
Dept: HOSPITAL 58 - RAD | Age: 22
Discharge: HOME | End: 2017-06-14
Attending: SPECIALIST

## 2017-06-14 VITALS — BODY MASS INDEX: 29.3 KG/M2

## 2017-06-14 DIAGNOSIS — R73.9: Primary | ICD-10-CM

## 2017-06-14 DIAGNOSIS — I10: ICD-10-CM

## 2017-06-14 DIAGNOSIS — M54.5: ICD-10-CM

## 2017-06-14 DIAGNOSIS — R94.5: ICD-10-CM

## 2017-06-14 LAB
ALBUMIN SERPL-MCNC: 4 G/DL (ref 3.4–5)
ALBUMIN/GLOB SERPL: 1.14 {RATIO}
ALP SERPL-CCNC: 75 U/L (ref 50–136)
ALT SERPL-CCNC: 438 U/L (ref 12–78)
ANION GAP SERPL CALC-SCNC: 12.8 MMOL/L
AST SERPL-CCNC: 197 U/L (ref 15–37)
BASOPHILS # BLD AUTO: 0 K/UL (ref 0–0.2)
BASOPHILS NFR BLD AUTO: 0.7 % (ref 0–3)
BILIRUB SERPL-MCNC: 0.56 MG/DL (ref 0–1.2)
BUN SERPL-MCNC: 10 MG/DL (ref 7–18)
BUN/CREAT SERPL: 8.13
CALCIUM SERPL-MCNC: 9.4 MG/DL (ref 8.2–10.2)
CHLORIDE SERPL-SCNC: 105 MMOL/L (ref 98–107)
CHOLEST SERPL-MCNC: 239 MG/DL (ref 0–200)
CHOLEST/HDLC SERPL: 5.3 {RATIO} (ref 4.5–6.4)
CO2 BLD-SCNC: 26 MMOL/L (ref 21–32)
CREAT SERPL-MCNC: 1.23 MG/DL (ref 0.6–1.1)
EOSINOPHIL # BLD AUTO: 0.1 K/UL (ref 0–0.7)
EOSINOPHIL NFR BLD AUTO: 2.1 % (ref 0–7)
GFR SERPLBLD BASED ON 1.73 SQ M-ARVRAT: 74 ML/MIN
GLOBULIN SER CALC-MCNC: 3.5 G/L
GLUCOSE SERPL-MCNC: 126 MG/DL (ref 70–100)
HCT VFR BLD AUTO: 38.9 % (ref 42–52)
HDLC SERPL-MCNC: 45 MG/DL (ref 35–60)
HGB BLD-MCNC: 12.5 G/DL (ref 14–18)
IMM GRANULOCYTES NFR BLD AUTO: 1.1 % (ref 0–5)
LYMPHOCYTES # SPEC AUTO: 2.8 K/UL (ref 0.6–3.4)
LYMPHOCYTES NFR BLD AUTO: 45.2 % (ref 10–50)
MCH RBC QN: 24.3 PG (ref 27–31)
MCHC RBC AUTO-ENTMCNC: 32.1 G/DL (ref 31.8–35.4)
MCV RBC: 75.7 FL (ref 80–94)
MONOCYTES # BLD AUTO: 0.5 K/UL (ref 0.4–2)
MONOCYTES NFR BLD AUTO: 7.8 % (ref 0–10)
NEUTROPHILS # BLD AUTO: 2.7 K/UL (ref 2–6.9)
NEUTROPHILS NFR BLD AUTO: 43.1 %
PLATELET # BLD AUTO: 232 10^3/UL (ref 140–440)
POTASSIUM SERPL-SCNC: 3.8 MMOL/L (ref 3.5–5.1)
PROT SERPL-MCNC: 7.5 G/DL (ref 6.4–8.2)
RBC # BLD AUTO: 5.14 10^6/UL (ref 4.7–6.1)
SODIUM SERPL-SCNC: 140 MMOL/L (ref 136–145)
TRIGL SERPL-MCNC: 191 MG/DL (ref 30–150)
VLDLC SERPL CALC-MCNC: 38 MG/DL (ref 2–30)
WBC # BLD AUTO: 6.15 K/UL (ref 4.2–10.2)

## 2017-06-14 PROCEDURE — 36415 COLL VENOUS BLD VENIPUNCTURE: CPT

## 2017-06-14 PROCEDURE — 85025 COMPLETE CBC W/AUTO DIFF WBC: CPT

## 2017-06-14 PROCEDURE — 83036 HEMOGLOBIN GLYCOSYLATED A1C: CPT

## 2017-06-14 PROCEDURE — 80061 LIPID PANEL: CPT

## 2017-06-14 PROCEDURE — 80053 COMPREHEN METABOLIC PANEL: CPT

## 2017-07-18 ENCOUNTER — TELEPHONE (OUTPATIENT)
Dept: GASTROENTEROLOGY | Facility: CLINIC | Age: 22
End: 2017-07-18

## 2017-07-20 ENCOUNTER — LAB (OUTPATIENT)
Dept: LAB | Facility: HOSPITAL | Age: 22
End: 2017-07-20
Attending: INTERNAL MEDICINE

## 2017-07-20 DIAGNOSIS — R79.89 ABNORMAL LFTS: ICD-10-CM

## 2017-07-20 LAB
ALBUMIN SERPL-MCNC: 4.7 G/DL (ref 3.5–5)
ALBUMIN/GLOB SERPL: 1.4 G/DL (ref 1.1–2.5)
ALP SERPL-CCNC: 75 U/L (ref 24–120)
ALT SERPL W P-5'-P-CCNC: 304 U/L (ref 0–54)
ANION GAP SERPL CALCULATED.3IONS-SCNC: 11 MMOL/L (ref 4–13)
AST SERPL-CCNC: 131 U/L (ref 7–45)
BASOPHILS # BLD AUTO: 0.03 10*3/MM3 (ref 0–0.2)
BASOPHILS NFR BLD AUTO: 0.5 % (ref 0–2)
BILIRUB SERPL-MCNC: 0.5 MG/DL (ref 0.1–1)
BUN BLD-MCNC: 11 MG/DL (ref 5–21)
BUN/CREAT SERPL: 10.3 (ref 7–25)
CALCIUM SPEC-SCNC: 9.6 MG/DL (ref 8.4–10.4)
CHLORIDE SERPL-SCNC: 102 MMOL/L (ref 98–110)
CO2 SERPL-SCNC: 29 MMOL/L (ref 24–31)
CREAT BLD-MCNC: 1.07 MG/DL (ref 0.5–1.4)
DEPRECATED RDW RBC AUTO: 38.3 FL (ref 40–54)
EOSINOPHIL # BLD AUTO: 0.07 10*3/MM3 (ref 0–0.7)
EOSINOPHIL NFR BLD AUTO: 1.3 % (ref 0–4)
ERYTHROCYTE [DISTWIDTH] IN BLOOD BY AUTOMATED COUNT: 14.1 % (ref 12–15)
GFR SERPL CREATININE-BSD FRML MDRD: 86 ML/MIN/1.73
GLOBULIN UR ELPH-MCNC: 3.4 GM/DL
GLUCOSE BLD-MCNC: 106 MG/DL (ref 70–100)
HCT VFR BLD AUTO: 41 % (ref 40–52)
HGB BLD-MCNC: 13 G/DL (ref 14–18)
IMM GRANULOCYTES # BLD: 0.06 10*3/MM3 (ref 0–0.03)
IMM GRANULOCYTES NFR BLD: 1.1 % (ref 0–5)
INR PPP: 0.94 (ref 0.91–1.09)
LYMPHOCYTES # BLD AUTO: 2.56 10*3/MM3 (ref 0.72–4.86)
LYMPHOCYTES NFR BLD AUTO: 45.7 % (ref 15–45)
MCH RBC QN AUTO: 23.9 PG (ref 28–32)
MCHC RBC AUTO-ENTMCNC: 31.7 G/DL (ref 33–36)
MCV RBC AUTO: 75.4 FL (ref 82–95)
MONOCYTES # BLD AUTO: 0.43 10*3/MM3 (ref 0.19–1.3)
MONOCYTES NFR BLD AUTO: 7.7 % (ref 4–12)
NEUTROPHILS # BLD AUTO: 2.45 10*3/MM3 (ref 1.87–8.4)
NEUTROPHILS NFR BLD AUTO: 43.7 % (ref 39–78)
PLATELET # BLD AUTO: 253 10*3/MM3 (ref 130–400)
PMV BLD AUTO: 11.5 FL (ref 6–12)
POTASSIUM BLD-SCNC: 4.2 MMOL/L (ref 3.5–5.3)
PROT SERPL-MCNC: 8.1 G/DL (ref 6.3–8.7)
PROTHROMBIN TIME: 12.9 SECONDS (ref 11.9–14.6)
RBC # BLD AUTO: 5.44 10*6/MM3 (ref 4.8–5.9)
SODIUM BLD-SCNC: 142 MMOL/L (ref 135–145)
WBC NRBC COR # BLD: 5.6 10*3/MM3 (ref 4.8–10.8)

## 2017-07-20 PROCEDURE — 85025 COMPLETE CBC W/AUTO DIFF WBC: CPT | Performed by: INTERNAL MEDICINE

## 2017-07-20 PROCEDURE — 36415 COLL VENOUS BLD VENIPUNCTURE: CPT

## 2017-07-20 PROCEDURE — 82103 ALPHA-1-ANTITRYPSIN TOTAL: CPT | Performed by: INTERNAL MEDICINE

## 2017-07-20 PROCEDURE — 85610 PROTHROMBIN TIME: CPT | Performed by: INTERNAL MEDICINE

## 2017-07-20 PROCEDURE — 80053 COMPREHEN METABOLIC PANEL: CPT | Performed by: INTERNAL MEDICINE

## 2017-07-21 ENCOUNTER — HOSPITAL ENCOUNTER (OUTPATIENT)
Dept: ULTRASOUND IMAGING | Facility: HOSPITAL | Age: 22
Discharge: HOME | End: 2017-07-21
Attending: INTERNAL MEDICINE | Admitting: INTERNAL MEDICINE

## 2017-07-21 ENCOUNTER — TELEPHONE (OUTPATIENT)
Dept: GASTROENTEROLOGY | Facility: CLINIC | Age: 22
End: 2017-07-21

## 2017-07-21 ENCOUNTER — HOSPITAL ENCOUNTER (OUTPATIENT)
Dept: CT IMAGING | Facility: HOSPITAL | Age: 22
Discharge: HOME | End: 2017-07-21
Attending: INTERNAL MEDICINE

## 2017-07-21 VITALS
SYSTOLIC BLOOD PRESSURE: 145 MMHG | DIASTOLIC BLOOD PRESSURE: 76 MMHG | BODY MASS INDEX: 31.18 KG/M2 | RESPIRATION RATE: 15 BRPM | WEIGHT: 194 LBS | HEART RATE: 57 BPM | HEIGHT: 66 IN | OXYGEN SATURATION: 98 %

## 2017-07-21 DIAGNOSIS — R79.89 ABNORMAL LFTS: ICD-10-CM

## 2017-07-21 DIAGNOSIS — R16.0 LIVER MASS: ICD-10-CM

## 2017-07-21 DIAGNOSIS — R16.0 LIVER MASS: Primary | ICD-10-CM

## 2017-07-21 LAB
A1AT SERPL-MCNC: 108 MG/DL (ref 90–200)
APTT PPP: 26.4 SECONDS (ref 24.1–34.8)
INR PPP: 0.94 (ref 0.91–1.09)
PLATELET # BLD AUTO: 226 10*3/MM3 (ref 130–400)
PROTHROMBIN TIME: 12.8 SECONDS (ref 11.9–14.6)

## 2017-07-21 PROCEDURE — 85049 AUTOMATED PLATELET COUNT: CPT | Performed by: INTERNAL MEDICINE

## 2017-07-21 PROCEDURE — 0 IOPAMIDOL 61 % SOLUTION: Performed by: INTERNAL MEDICINE

## 2017-07-21 PROCEDURE — 85730 THROMBOPLASTIN TIME PARTIAL: CPT | Performed by: INTERNAL MEDICINE

## 2017-07-21 PROCEDURE — 74178 CT ABD&PLV WO CNTR FLWD CNTR: CPT

## 2017-07-21 PROCEDURE — 76705 ECHO EXAM OF ABDOMEN: CPT

## 2017-07-21 PROCEDURE — 85610 PROTHROMBIN TIME: CPT | Performed by: INTERNAL MEDICINE

## 2017-07-21 RX ADMIN — IOPAMIDOL 150 ML: 612 INJECTION, SOLUTION INTRAVENOUS at 16:15

## 2017-07-21 NOTE — TELEPHONE ENCOUNTER
Dr. Westfall, radiologist called stating that she is not going to do the liver bx today due to a 2.1 cm hypoechoic lesion in his liver. They are going to do a RUQ ultrasound today instead. She said if you want to talk to her about this, you can call her at [phone] or call her cell: [phone]

## 2017-07-21 NOTE — TELEPHONE ENCOUNTER
Dr. Souza called me about a new finding on his ultrasound revealing a 2 cm hypoechoic lesion.  She recommended CT scan of the liver with liver protocol.  She recommended this before proceeding with liver biopsy.  I have put an order in for a CT scan with liver protocol.  Continue get this preapproved from insurance.  The patient has been discharged from radiology per the radiologist with instructions to contact the office on Monday in regards to setting up CT.

## 2017-07-22 DIAGNOSIS — R10.11 RUQ ABDOMINAL PAIN: Primary | ICD-10-CM

## 2017-07-24 NOTE — PROGRESS NOTES
Spoke with mother.  PT is with her in [facility].  I gave her # for scheduling to contact to schedule his hida scan.

## 2017-07-27 ENCOUNTER — TELEPHONE (OUTPATIENT)
Dept: GASTROENTEROLOGY | Facility: CLINIC | Age: 22
End: 2017-07-27

## 2017-07-27 NOTE — TELEPHONE ENCOUNTER
"Per REG/SCHED regarding the Hida Scan \"WE'VE ATTEMPTED LEAVING CALLING AND LEAVING MESSAGES X3. PATIENT HAS NOT CONTACTED SCHEDULING. HOW WOULD LIKE FOR US TO PROCEED?\"      Do you want me to reach out by sending a letter?  "

## 2017-07-31 ENCOUNTER — HOSPITAL ENCOUNTER (OUTPATIENT)
Dept: NUCLEAR MEDICINE | Facility: HOSPITAL | Age: 22
Discharge: HOME | End: 2017-07-31
Attending: INTERNAL MEDICINE

## 2017-07-31 DIAGNOSIS — R79.89 ABNORMAL LFTS: Primary | ICD-10-CM

## 2017-07-31 PROCEDURE — A9537 TC99M MEBROFENIN: HCPCS | Performed by: INTERNAL MEDICINE

## 2017-07-31 PROCEDURE — 0 TECHNETIUM TC 99M MEBROFENIN KIT: Performed by: INTERNAL MEDICINE

## 2017-07-31 PROCEDURE — 25010000002 SINCALIDE PER 5 MCG: Performed by: INTERNAL MEDICINE

## 2017-07-31 PROCEDURE — 78227 HEPATOBIL SYST IMAGE W/DRUG: CPT

## 2017-07-31 RX ORDER — KIT FOR THE PREPARATION OF TECHNETIUM TC 99M MEBROFENIN 45 MG/10ML
1 INJECTION, POWDER, LYOPHILIZED, FOR SOLUTION INTRAVENOUS
Status: COMPLETED | OUTPATIENT
Start: 2017-07-31 | End: 2017-07-31

## 2017-07-31 RX ADMIN — MEBROFENIN 1 DOSE: 45 INJECTION, POWDER, LYOPHILIZED, FOR SOLUTION INTRAVENOUS at 08:30

## 2017-07-31 RX ADMIN — SINCALIDE 2 MCG: 5 INJECTION, POWDER, LYOPHILIZED, FOR SOLUTION INTRAVENOUS at 09:00

## 2017-08-04 ENCOUNTER — HOSPITAL ENCOUNTER (OUTPATIENT)
Dept: ULTRASOUND IMAGING | Facility: HOSPITAL | Age: 22
Discharge: HOME | End: 2017-08-04
Attending: INTERNAL MEDICINE | Admitting: INTERNAL MEDICINE

## 2017-08-04 VITALS
HEIGHT: 66 IN | OXYGEN SATURATION: 99 % | WEIGHT: 195 LBS | DIASTOLIC BLOOD PRESSURE: 74 MMHG | BODY MASS INDEX: 31.34 KG/M2 | RESPIRATION RATE: 18 BRPM | SYSTOLIC BLOOD PRESSURE: 133 MMHG | HEART RATE: 67 BPM | TEMPERATURE: 98 F

## 2017-08-04 DIAGNOSIS — R79.89 ABNORMAL LFTS: ICD-10-CM

## 2017-08-04 PROCEDURE — 76942 ECHO GUIDE FOR BIOPSY: CPT

## 2017-08-04 PROCEDURE — 88307 TISSUE EXAM BY PATHOLOGIST: CPT | Performed by: INTERNAL MEDICINE

## 2017-08-04 RX ORDER — HYDROCODONE BITARTRATE AND ACETAMINOPHEN 7.5; 325 MG/1; MG/1
1 TABLET ORAL ONCE AS NEEDED
Status: COMPLETED | OUTPATIENT
Start: 2017-08-04 | End: 2017-08-04

## 2017-08-04 RX ORDER — HYDROCODONE BITARTRATE AND ACETAMINOPHEN 7.5; 325 MG/1; MG/1
TABLET ORAL
Status: COMPLETED
Start: 2017-08-04 | End: 2017-08-04

## 2017-08-04 RX ADMIN — HYDROCODONE BITARTRATE AND ACETAMINOPHEN 1 TABLET: 7.5; 325 TABLET ORAL at 12:31

## 2017-08-07 LAB
CYTO UR: NORMAL
LAB AP CASE REPORT: NORMAL
Lab: NORMAL
PATH REPORT.FINAL DX SPEC: NORMAL
PATH REPORT.GROSS SPEC: NORMAL

## 2017-08-08 ENCOUNTER — TELEPHONE (OUTPATIENT)
Dept: GASTROENTEROLOGY | Facility: CLINIC | Age: 22
End: 2017-08-08

## 2017-08-24 ENCOUNTER — HOSPITAL ENCOUNTER (OUTPATIENT)
Dept: HOSPITAL 58 - DIETCN | Age: 22
Discharge: HOME | End: 2017-08-24
Attending: NURSE PRACTITIONER

## 2017-08-24 VITALS — BODY MASS INDEX: 31.4 KG/M2

## 2017-08-24 DIAGNOSIS — K76.0: ICD-10-CM

## 2017-08-24 DIAGNOSIS — E11.9: Primary | ICD-10-CM

## 2017-08-24 PROCEDURE — 97802 MEDICAL NUTRITION INDIV IN: CPT

## 2017-09-21 ENCOUNTER — HOSPITAL ENCOUNTER (OUTPATIENT)
Dept: HOSPITAL 58 - RAD | Age: 22
Discharge: HOME | End: 2017-09-21
Attending: NURSE PRACTITIONER

## 2017-09-21 VITALS — BODY MASS INDEX: 31.4 KG/M2

## 2017-09-21 DIAGNOSIS — E11.9: ICD-10-CM

## 2017-09-21 DIAGNOSIS — R10.9: ICD-10-CM

## 2017-09-21 DIAGNOSIS — R10.817: Primary | ICD-10-CM

## 2017-09-21 LAB
ADD URINE MICROSCOPIC: NO
ALBUMIN SERPL-MCNC: 4 G/DL (ref 3.4–5)
ALBUMIN/GLOB SERPL: 1.08 {RATIO}
ALP SERPL-CCNC: 77 U/L (ref 50–136)
ALT SERPL-CCNC: 207 U/L (ref 12–78)
ANION GAP SERPL CALC-SCNC: 15.5 MMOL/L
AST SERPL-CCNC: 74 U/L (ref 15–37)
BASOPHILS # BLD AUTO: 0 K/UL (ref 0–0.2)
BASOPHILS NFR BLD AUTO: 0.6 % (ref 0–3)
BILIRUB SERPL-MCNC: 0.42 MG/DL (ref 0–1.2)
BUN SERPL-MCNC: 13 MG/DL (ref 7–18)
BUN/CREAT SERPL: 10.4
CALCIUM SERPL-MCNC: 9.7 MG/DL (ref 8.2–10.2)
CHLORIDE SERPL-SCNC: 103 MMOL/L (ref 98–107)
CO2 BLD-SCNC: 27 MMOL/L (ref 21–32)
CREAT SERPL-MCNC: 1.25 MG/DL (ref 0.6–1.1)
EOSINOPHIL # BLD AUTO: 0.1 K/UL (ref 0–0.7)
EOSINOPHIL NFR BLD AUTO: 1.5 % (ref 0–7)
GFR SERPLBLD BASED ON 1.73 SQ M-ARVRAT: 72 ML/MIN
GLOBULIN SER CALC-MCNC: 3.7 G/L
GLUCOSE SERPL-MCNC: 111 MG/DL (ref 70–100)
HCT VFR BLD AUTO: 40.5 % (ref 42–52)
HGB BLD-MCNC: 13.2 G/DL (ref 14–18)
IMM GRANULOCYTES NFR BLD AUTO: 0.9 % (ref 0–5)
LYMPHOCYTES # SPEC AUTO: 2.4 K/UL (ref 0.6–3.4)
LYMPHOCYTES NFR BLD AUTO: 46 % (ref 10–50)
MCH RBC QN: 25 PG (ref 27–31)
MCHC RBC AUTO-ENTMCNC: 32.6 G/DL (ref 31.8–35.4)
MCV RBC: 76.7 FL (ref 80–94)
MONOCYTES # BLD AUTO: 0.4 K/UL (ref 0.4–2)
MONOCYTES NFR BLD AUTO: 7.2 % (ref 0–10)
NEUTROPHILS # BLD AUTO: 2.3 K/UL (ref 2–6.9)
NEUTROPHILS NFR BLD AUTO: 43.8 %
PH UR: 6 [PH] (ref 5–9)
PLATELET # BLD AUTO: 238 10^3/UL (ref 140–440)
POTASSIUM SERPL-SCNC: 4.5 MMOL/L (ref 3.5–5.1)
PROT SERPL-MCNC: 7.7 G/DL (ref 6.4–8.2)
RBC # BLD AUTO: 5.28 10^6/UL (ref 4.7–6.1)
SODIUM SERPL-SCNC: 141 MMOL/L (ref 136–145)
SP GR UR: 1.02 (ref 1–1.03)
WBC # BLD AUTO: 5.28 K/UL (ref 4.2–10.2)

## 2017-09-21 PROCEDURE — 83036 HEMOGLOBIN GLYCOSYLATED A1C: CPT

## 2017-09-21 PROCEDURE — 36415 COLL VENOUS BLD VENIPUNCTURE: CPT

## 2017-09-21 PROCEDURE — 85025 COMPLETE CBC W/AUTO DIFF WBC: CPT

## 2017-09-21 PROCEDURE — 80053 COMPREHEN METABOLIC PANEL: CPT

## 2017-09-21 PROCEDURE — 81001 URINALYSIS AUTO W/SCOPE: CPT

## 2017-09-21 NOTE — DI
EXAM:  KUB. 

  

History:  Abdominal pain. 

  

Comparison:  CT abdomen pelvis 04/05/2015 

  

Findings:  Nonspecific but nonobstructive bowel gas pattern.  No free intraperitoneal air.  No suspic
ious calcifications are seen projecting over the renal shadows.  No acute osseous abnormalities.  Mil
d to moderate scattered colonic stool. 

  

Impression:  No acute radiographic findings within the abdomen.

## 2018-01-04 ENCOUNTER — OFFICE VISIT (OUTPATIENT)
Dept: FAMILY MEDICINE CLINIC | Facility: CLINIC | Age: 23
End: 2018-01-04

## 2018-01-04 VITALS
WEIGHT: 197 LBS | SYSTOLIC BLOOD PRESSURE: 140 MMHG | OXYGEN SATURATION: 99 % | RESPIRATION RATE: 18 BRPM | DIASTOLIC BLOOD PRESSURE: 100 MMHG | HEART RATE: 79 BPM | HEIGHT: 66 IN | TEMPERATURE: 98.9 F | BODY MASS INDEX: 31.66 KG/M2

## 2018-01-04 DIAGNOSIS — R51.9 CHRONIC NONINTRACTABLE HEADACHE, UNSPECIFIED HEADACHE TYPE: ICD-10-CM

## 2018-01-04 DIAGNOSIS — R03.0 ELEVATED BLOOD PRESSURE READING: ICD-10-CM

## 2018-01-04 DIAGNOSIS — E66.9 OBESITY, UNSPECIFIED CLASSIFICATION, UNSPECIFIED OBESITY TYPE, UNSPECIFIED WHETHER SERIOUS COMORBIDITY PRESENT: Primary | ICD-10-CM

## 2018-01-04 DIAGNOSIS — Z20.828 EXPOSURE TO MONONUCLEOSIS SYNDROME: ICD-10-CM

## 2018-01-04 DIAGNOSIS — G89.29 CHRONIC NONINTRACTABLE HEADACHE, UNSPECIFIED HEADACHE TYPE: ICD-10-CM

## 2018-01-04 DIAGNOSIS — R51.9 FREQUENT HEADACHES: ICD-10-CM

## 2018-01-04 DIAGNOSIS — R79.89 ABNORMAL LFTS: ICD-10-CM

## 2018-01-04 DIAGNOSIS — K76.0 FATTY LIVER: ICD-10-CM

## 2018-01-04 PROCEDURE — 99214 OFFICE O/P EST MOD 30 MIN: CPT | Performed by: FAMILY MEDICINE

## 2018-01-04 NOTE — PATIENT INSTRUCTIONS
Goal for your blood pressure is 130 range top and 80 range bottom and you feeling better. Use us/or home monitoring to prove this.   Once a day or every other day (move around times).

## 2018-01-04 NOTE — PROGRESS NOTES
Subjective   Mike Rowan is a 22 y.o. male presenting with chief complaint of:   Chief Complaint   Patient presents with   • Establish Care     New Patient       History of Present Illness :  with grandmother; lives with her here to help grandmother/grandfather just .  [place]/dad [place]; [place]/area. .  Here for primarily a/an Chronic issue today.   Has has  multiple chronic problems to consider and to re-establish prior care.     Other chronic problem/s to consider:   HTN.  The HTN has been present for years/it is chronic.  The HTN is assumed essential/without testing needed to look for other.  The HTN is ? controlled manifest by todays blood pressure and no home monitoring.  Associated illness below.   Anxiety: This has been present for years/over a year.  It is chronic.  It is variable; for years.  It is associated with stressors: school/loss of grandfather.  Medications being used help.   Medications/Rx change not requested.  Has seen mental health.   Depression: This has been present for years/over a year.  It is chronic.  It is varible; same as anxiety.  It is associated with stressors: same.  Medications being used help. Medications/Rx change not requested. No suicide ideation/intent.   GE reflux/heartburn: This has been present for years/over a year.  It is a chronic condition.   It is stable as there is no change in infrequent heartburn and no dysphagia   It is not stable and there has been a change in more frequent heartburn and dysphagia.  Medication required to control symptoms; protonix.  Same time had xrays shieben; fatty liver. Because chronic pain RUQ; had liver bx with a lot of fatty liver (father has cirrhosis).   Also told shieben; IBS.   Chronic headaches:  Near daily/for years (chronic).   No CT/MRI ever he is aware.     Has an/another acute issue today: none.    The following portions of the patient's history were reviewed and updated as appropriate: allergies, current medications,  past family history, past medical history, past social history, past surgical history and problem list.  Records acquired and reviewed; TCC migrated.     Single+  Home work  [id number]  Hypertension  Anxiety-paxil helps  GE reflux-improved  Fatty liver  Obesity        Current Outpatient Prescriptions:   •  amLODIPine (NORVASC) 10 MG tablet, Take 10 mg by mouth Daily., Disp: , Rfl:   •  lisinopril (PRINIVIL,ZESTRIL) 20 MG tablet, Take 20 mg by mouth 2 (Two) Times a Day., Disp: , Rfl:   •  metoprolol tartrate (LOPRESSOR) 50 MG tablet, Take 100 mg by mouth 2 (Two) Times a Day., Disp: , Rfl:   •  pantoprazole (PROTONIX) 40 MG EC tablet, Take 1 tablet by mouth Daily., Disp: 30 tablet, Rfl: 11  •  PARoxetine (PAXIL) 10 MG tablet, Take 10 mg by mouth Every Morning., Disp: , Rfl:     Allergies   Allergen Reactions   • Pertussis Vaccines        Review of Systems  GENERAL:  Active/slower with limits, speed, stamina for age but not much exercise. Sleep is ok; apnea denied. No fever now.  ENDO:  No syncope, near or diaphoretic sweaty spells.  HEENT: No head injury; more and more  headache,  No vision change, No significant hearing loss.  Ears without pain/drainage.  No sore throat.  Usual on/off nasal/sinus congestion/drainage. No epistaxis.  CHEST: No chest wall tenderness or mass. No significant cough, without wheeze.  No SOB; no hemoptysis.  CV: No chest pain, palpitations, ankle edema.  GI: No heartburn, dysphagia.  RUQ pain for months; no other abdominal pain, diarrhea, constipation.  No rectal bleeding, or melena.    :  Voids without dysuria, or incontinence to completion.  ORTHO: No painful/swollen joints but various on /off sore.  No change occ sore neck or back.  No acute neck or back pain without recent injury.   NEURO: No dizziness, weakness of extremities.  No numbness/paresthesias.   PSYCH: No memory loss.  Mood good; often anxious, depressed but/and not suicidal.  Tries to tolerate stress .  "    Results for orders placed or performed during the hospital encounter of 08/04/17   Tissue Pathology Exam   Result Value Ref Range    Case Report       Surgical Pathology Report                         Case: [id number]                                  Authorizing Provider:  Basim Martinez MD       Collected:           08/04/2017 11:26 AM          Ordering Location:     [facility]  Received:            08/04/2017 11:39 AM          Pathologist:           Narinder Dawson MD                                                         Specimen:    Liver, core samples of right lobe of liver                                                 Final Diagnosis       Liver, right lobe, core biopsy:  Severe steatosis.      Gross Description       Specimen #1 is received in a formalin filled container, labeled with the patient's name, date of birth, and \"liver biopsies right lobe\".  The specimen consists of 4 yellow-pink off tissue core biopsies ranging from 0.5 cm in length by less than 0.1 cm in diameter to 1.2 cm in length by less than 0.1 cm in diameter.  The cores are totally submitted in blocks 1A and 1B.      Microscopic Description       Sections demonstrate multiple cores of benign liver parenchyma with severe fatty change predominantly of the macrovesicular type.  The portal areas show no significant inflammation.  The iron stain is negative for increased iron stores.  The trichrome stain fails to reveal significant periportal fibrosis.  The reticulin stain highlights the normal palate plate architecture.      Embedded Images         No results found for: HGBA1C    Lab Results   Component Value Date     07/20/2017     03/02/2017    K 4.2 07/20/2017    K 3.9 03/02/2017     07/20/2017     03/02/2017    CO2 29.0 07/20/2017    CO2 28.0 03/02/2017    GLUCOSE 106 (H) 07/20/2017    GLUCOSE 125 (H) 03/02/2017    BUN 11 07/20/2017    BUN 10 03/02/2017    CREATININE 1.07 07/20/2017    " "CREATININE 1.04 03/02/2017    CALCIUM 9.6 07/20/2017    CALCIUM 9.6 03/02/2017       No results found for: PSA     Lab Results:  CBC:    Lab Results - Last 18 Months  Lab Units 07/20/17  1134 03/02/17  0906   WBC 10*3/mm3 5.60 5.79   HEMATOCRIT % 41.0 42.8     CMP:    Lab Results - Last 18 Months  Lab Units 07/20/17  1134 03/02/17  0906   SODIUM mmol/L 142 141   CHLORIDE mmol/L 102 102   CO2 mmol/L 29.0 28.0   BUN mg/dL 11 10   CREATININE mg/dL 1.07 1.04   EGFR IF NONAFRICN AM mL/min/1.73 86 89   CALCIUM mg/dL 9.6 9.6     THYROID:No results for input(s): TSH, T3FREE, FREET4 in the last 60282 hours.    Invalid input(s): T3, T4  A1C:No results for input(s): HGBA1C in the last 17488 hours.    Objective   /100 (BP Location: Left arm, Patient Position: Sitting, Cuff Size: Adult)  Pulse 79  Temp 98.9 °F (37.2 °C)  Resp 18  Ht 167.6 cm (66\")  Wt 89.4 kg (197 lb)  SpO2 99%  BMI 31.8 kg/m2    Physical Exam  GENERAL:  Well nourished/developed in no acute distress. Obese   SKIN: Turgor excellent, without wound, rash, lesion.  HEENT: Normal cephalic without trauma.  Pupils equal round reactive to light. Extraocular motions full without nystagmus.   External canals nonobstructive nontender without reddness. Tymphatic membranes rory with corrina structures intact.   Oral cavity without growths, exudates, and moist.  Posterior pharynx without mass, obstruction, redness.  No thyromegaly, mass, tenderness, lymphadenopathy and supple.  Neck short.   CV: Regular rhythm.  No murmur, gallop,  edema. Posterior pulses intact.  No carotid bruits.  CHEST: No chest wall tenderness or mass.   LUNGS: Symmetric motion with clear to auscultation.   ABD: Soft, nontender without mass.   ORTHO: Symmetric extremities without swelling/point tenderness.  Full gross range of motion.  NEURO: CN 2-12 grossly intact.  Symmetric facies. 1/4 x bicep knee equal reflexes.  UE/LE   4/5 strength throughout.  Nonfocal use extremities. Speech clear. " Intact light touch with monofilament, vibratory sensation with tuning fork; equal toes/distal feet.    PSYCH: Oriented x 3.  Pleasant calm, well kept.  Purposeful/directed conservation with intact short/long gross memory.     Assessment/Plan     1. Obesity, unspecified classification, unspecified obesity type, unspecified whether serious comorbidity present    2. Fatty liver    3. Chronic nonintractable headache, unspecified headache type    4. Elevated blood pressure reading    5. Exposure to mononucleosis syndrome    6. Abnormal LFTs    7. Frequent headaches        Rx: reviewed/changes:  Same; pending tests    LAB: reviewed/orders:   Orders Placed This Encounter   Procedures   • MRI Brain With & Without Contrast   • Ambulatory Referral to Nutrition Services       Discussions:   Wrap up    Patient Instructions   Goal for your blood pressure is 130 range top and 80 range bottom and you feeling better. Use us/or home monitoring to prove this.   Once a day or every other day (move around times).         Follow up: Return for fasting lab when can.  , Dr Layne-2-3 weeks.  Future Appointments  Date Time Provider Department Center   1/23/2018 11:45 AM Stone Layne MD [facility] None

## 2018-01-05 DIAGNOSIS — R10.11 RIGHT UPPER QUADRANT ABDOMINAL PAIN: ICD-10-CM

## 2018-01-05 DIAGNOSIS — K76.0 FATTY LIVER: Primary | ICD-10-CM

## 2018-01-05 DIAGNOSIS — R51.9 CHRONIC NONINTRACTABLE HEADACHE, UNSPECIFIED HEADACHE TYPE: ICD-10-CM

## 2018-01-05 DIAGNOSIS — E66.9 OBESITY, UNSPECIFIED CLASSIFICATION, UNSPECIFIED OBESITY TYPE, UNSPECIFIED WHETHER SERIOUS COMORBIDITY PRESENT: ICD-10-CM

## 2018-01-05 DIAGNOSIS — K58.9 IRRITABLE BOWEL SYNDROME WITHOUT DIARRHEA: ICD-10-CM

## 2018-01-05 DIAGNOSIS — G89.29 CHRONIC NONINTRACTABLE HEADACHE, UNSPECIFIED HEADACHE TYPE: ICD-10-CM

## 2018-01-05 DIAGNOSIS — R79.89 ABNORMAL LFTS: ICD-10-CM

## 2018-01-07 PROBLEM — Z00.00 WELLNESS EXAMINATION: Status: ACTIVE | Noted: 2018-01-07

## 2018-01-07 PROBLEM — R51.9 FREQUENT HEADACHES: Status: ACTIVE | Noted: 2018-01-07

## 2018-01-08 ENCOUNTER — HOSPITAL ENCOUNTER (OUTPATIENT)
Dept: HOSPITAL 58 - RAD | Age: 23
Discharge: HOME | End: 2018-01-08
Attending: FAMILY MEDICINE

## 2018-01-08 ENCOUNTER — RESULTS ENCOUNTER (OUTPATIENT)
Dept: FAMILY MEDICINE CLINIC | Facility: CLINIC | Age: 23
End: 2018-01-08

## 2018-01-08 VITALS — BODY MASS INDEX: 31.4 KG/M2

## 2018-01-08 DIAGNOSIS — E66.9: ICD-10-CM

## 2018-01-08 DIAGNOSIS — E66.9 OBESITY, UNSPECIFIED CLASSIFICATION, UNSPECIFIED OBESITY TYPE, UNSPECIFIED WHETHER SERIOUS COMORBIDITY PRESENT: ICD-10-CM

## 2018-01-08 DIAGNOSIS — R10.11: ICD-10-CM

## 2018-01-08 DIAGNOSIS — R79.89: ICD-10-CM

## 2018-01-08 DIAGNOSIS — G89.29: ICD-10-CM

## 2018-01-08 DIAGNOSIS — K76.0: ICD-10-CM

## 2018-01-08 DIAGNOSIS — K58.9 IRRITABLE BOWEL SYNDROME WITHOUT DIARRHEA: ICD-10-CM

## 2018-01-08 DIAGNOSIS — R79.89 ABNORMAL LFTS: ICD-10-CM

## 2018-01-08 DIAGNOSIS — R10.11 RIGHT UPPER QUADRANT ABDOMINAL PAIN: ICD-10-CM

## 2018-01-08 DIAGNOSIS — K58.9: ICD-10-CM

## 2018-01-08 DIAGNOSIS — R51.9 CHRONIC NONINTRACTABLE HEADACHE, UNSPECIFIED HEADACHE TYPE: ICD-10-CM

## 2018-01-08 DIAGNOSIS — G89.29 CHRONIC NONINTRACTABLE HEADACHE, UNSPECIFIED HEADACHE TYPE: ICD-10-CM

## 2018-01-08 DIAGNOSIS — R51: Primary | ICD-10-CM

## 2018-01-08 DIAGNOSIS — K76.0 FATTY LIVER: ICD-10-CM

## 2018-01-08 DIAGNOSIS — Z00.00 WELLNESS EXAMINATION: ICD-10-CM

## 2018-01-08 DIAGNOSIS — K76.0 FATTY LIVER: Primary | ICD-10-CM

## 2018-01-08 PROBLEM — Z01.89 LABORATORY TEST: Status: ACTIVE | Noted: 2018-01-08

## 2018-01-08 PROCEDURE — 84436 ASSAY OF TOTAL THYROXINE: CPT

## 2018-01-08 PROCEDURE — 36415 COLL VENOUS BLD VENIPUNCTURE: CPT

## 2018-01-08 PROCEDURE — 80053 COMPREHEN METABOLIC PANEL: CPT

## 2018-01-08 PROCEDURE — 85025 COMPLETE CBC W/AUTO DIFF WBC: CPT

## 2018-01-08 PROCEDURE — 80061 LIPID PANEL: CPT

## 2018-01-08 PROCEDURE — 84443 ASSAY THYROID STIM HORMONE: CPT

## 2018-01-08 NOTE — MRI
EXAM:  Brain MRI with and without contrast. 

  

HISTORY:  Chronic headache. 

  

COMPARISON:  Head CT 12/08/2008 and head CT 03/14/2008. 

  

TECHNIQUE:  Multiplanar, multisequence MR images were acquired of the brain with and without contrast
. 

  

FINDINGS:  The midline structures are central and the craniocervical junction is unremarkable.  The v
entricles are normal in size.  There is mild prominence of the subarachnoid space around both cerebel
lar hemisphere and mild prominence of some sulci.  The ventricles are normal in size.  These findings
 are compatible with normal variation.  There are no abnormal extra-axial fluid collections. 

  

The brain parenchyma has no restricted diffusion to suggest acute hypoperfusion or infarction.  There
 are no abnormal T2 hyperintensities or foci of dark gradient echo signal to suggest intracranial hem
orrhage.  The corpus callosum has a normal configuration.  The pituitary gland is normal in size with
 a mildly convex superior border that is considered normal for the patient's age.  The gland measures
 7.3 mm in height which is normal.  After administration of contrast, no enhancing masses are identif
ied. 

  

There are no intraorbital masses.  Minor focal mucosal thickening is present in the left maxillary si
nus.  The frontal sinus is hypoplastic.  There is focal mucosal thickening in the right sphenoid sinu
s.  Middle ears and mastoids are clear.  There is mild adenoidal hypertrophy with a few nasopharyngea
l submucosal cysts.  There is no abnormal contrast enhancement in the internal auditory canals or lab
yrinthine structures. 

  

Flow voids are present in the major intracranial arteries and dural venous sinuses. 

  

IMPRESSION:  No intracranial mass, hemorrhage or acute cerebral infarct. Negative brain MRI.

## 2018-01-23 ENCOUNTER — OFFICE VISIT (OUTPATIENT)
Dept: FAMILY MEDICINE CLINIC | Facility: CLINIC | Age: 23
End: 2018-01-23

## 2018-01-23 VITALS
TEMPERATURE: 98.3 F | WEIGHT: 196 LBS | OXYGEN SATURATION: 98 % | HEART RATE: 64 BPM | RESPIRATION RATE: 18 BRPM | DIASTOLIC BLOOD PRESSURE: 70 MMHG | SYSTOLIC BLOOD PRESSURE: 130 MMHG | HEIGHT: 66 IN | BODY MASS INDEX: 31.5 KG/M2

## 2018-01-23 DIAGNOSIS — G89.29 CHRONIC BACK PAIN, UNSPECIFIED BACK LOCATION, UNSPECIFIED BACK PAIN LATERALITY: ICD-10-CM

## 2018-01-23 DIAGNOSIS — E66.9 OBESITY, UNSPECIFIED CLASSIFICATION, UNSPECIFIED OBESITY TYPE, UNSPECIFIED WHETHER SERIOUS COMORBIDITY PRESENT: ICD-10-CM

## 2018-01-23 DIAGNOSIS — G89.29 CHRONIC ABDOMINAL PAIN: ICD-10-CM

## 2018-01-23 DIAGNOSIS — R79.89 ABNORMAL LFTS: Primary | ICD-10-CM

## 2018-01-23 DIAGNOSIS — R10.9 CHRONIC ABDOMINAL PAIN: ICD-10-CM

## 2018-01-23 DIAGNOSIS — K76.0 FATTY LIVER: ICD-10-CM

## 2018-01-23 DIAGNOSIS — M54.9 CHRONIC BACK PAIN, UNSPECIFIED BACK LOCATION, UNSPECIFIED BACK PAIN LATERALITY: ICD-10-CM

## 2018-01-23 DIAGNOSIS — R51.9 FREQUENT HEADACHES: ICD-10-CM

## 2018-01-23 PROBLEM — E78.5 HYPERLIPIDEMIA: Status: ACTIVE | Noted: 2018-01-23

## 2018-01-23 PROCEDURE — 99214 OFFICE O/P EST MOD 30 MIN: CPT | Performed by: FAMILY MEDICINE

## 2018-01-23 RX ORDER — METOPROLOL TARTRATE 50 MG/1
100 TABLET, FILM COATED ORAL EVERY 12 HOURS SCHEDULED
Qty: 120 TABLET | Refills: 5 | Status: SHIPPED | OUTPATIENT
Start: 2018-01-23 | End: 2018-01-24 | Stop reason: DRUGHIGH

## 2018-01-23 RX ORDER — PAROXETINE 10 MG/1
10 TABLET, FILM COATED ORAL EVERY MORNING
Qty: 30 TABLET | Refills: 5 | Status: SHIPPED | OUTPATIENT
Start: 2018-01-23 | End: 2018-07-24 | Stop reason: SDUPTHER

## 2018-01-23 RX ORDER — LISINOPRIL 20 MG/1
20 TABLET ORAL DAILY
Qty: 30 TABLET | Refills: 5 | Status: SHIPPED | OUTPATIENT
Start: 2018-01-23 | End: 2018-05-04 | Stop reason: DRUGHIGH

## 2018-01-23 RX ORDER — AMLODIPINE BESYLATE 10 MG/1
10 TABLET ORAL DAILY
Qty: 30 TABLET | Refills: 5 | Status: SHIPPED | OUTPATIENT
Start: 2018-01-23 | End: 2018-07-24 | Stop reason: SDUPTHER

## 2018-01-23 RX ORDER — DICYCLOMINE HYDROCHLORIDE 10 MG/1
10 CAPSULE ORAL
COMMUNITY
End: 2018-07-24 | Stop reason: SDUPTHER

## 2018-01-23 RX ORDER — METOPROLOL TARTRATE 50 MG/1
100 TABLET, FILM COATED ORAL DAILY
Qty: 60 TABLET | Refills: 5 | Status: SHIPPED | OUTPATIENT
Start: 2018-01-23 | End: 2018-01-23 | Stop reason: SDUPTHER

## 2018-01-23 NOTE — PROGRESS NOTES
Subjective   Mike Rowan is a 22 y.o. male presenting with chief complaint of:   Chief Complaint   Patient presents with   • Headache   • Irritable Bowel Syndrome   • fatty liver   • Abdominal Pain       History of Present Illness :  with grandmother.  Here for primarily a/an Chronic issue today; several issues last visit.   Has has  multiple chronic problems to consider and fatty liver is a chronic problem..    Last visit:   . Obesity, unspecified classification, unspecified obesity type, unspecified whether serious comorbidity present    2. Fatty liver    3. Chronic nonintractable headache, unspecified headache type    4. Elevated blood pressure reading    5. Exposure to mononucleosis syndrome    6. Abnormal LFTs    7. Frequent headaches      MRI head ok.     Other chronic problem/s to consider: none  Chronic back pain:  The pain has been present for years/over a year.   It is chronic.   The pain is stable.  2-3 /10 pain most of time and usually is worse after activities.  The pain limits activities.  The problem has been evaulated and the patient has desire to get MRI  GE reflux/heartburn: This has been present for years/over a year.  It is a chronic condition.   It is stable as there is no change in infrequent heartburn and no dysphagia.  Medication required to control symptoms.  Protonix Rx.  Has seen Matt.   Hyperlipidemia: This has been present for years/over a year.  It is chronic.   It is generally controlled.   .  Labs are needed periodic to monitor condition/safetly.   Rx therapy Lipitor tx.  Fatty liver: This was discovered years ago/it is chronic.  This problem is asymptomatic for pain, nausea.  Previously advised regular labs to follow this and there can be connection with cirrhosis/life threating liver disease.  Advised often helpful to normalize weight.   Obesity/overweight: This has been present for years/over a year.  It is chronic.     It is stable; there has been no recent major change  in weight, or dieting  Associated illnesses noted that are affected by this illness.   Chronic abdominal pain.  Diffuse on/off.  No good pattern diet/not/activity.  Has seen Shieben; told IBS part of problem.   Frequent HA:  Chronic; MRI neg.  Suggestive tension/or migraine.     Has an/another acute issue today: none.    The following portions of the patient's history were reviewed and updated as appropriate: allergies, current medications, past family history, past medical history, past social history, past surgical history and problem list.  Records acquired and reviewed; TCC migrated.    Current Outpatient Prescriptions:   •  amLODIPine (NORVASC) 10 MG tablet, Take 1 tablet by mouth Daily., Disp: 30 tablet, Rfl: 5  •  dicyclomine (BENTYL) 10 MG capsule, Take 10 mg by mouth 4 (Four) Times a Day Before Meals & at Bedtime., Disp: , Rfl:   •  lisinopril (PRINIVIL,ZESTRIL) 20 MG tablet, Take 1 tablet by mouth Daily., Disp: 30 tablet, Rfl: 5  •  metoprolol tartrate (LOPRESSOR) 50 MG tablet, Take 2 tablets by mouth Every 12 (Twelve) Hours., Disp: 120 tablet, Rfl: 5  •  pantoprazole (PROTONIX) 40 MG EC tablet, Take 1 tablet by mouth Daily., Disp: 30 tablet, Rfl: 11  •  PARoxetine (PAXIL) 10 MG tablet, Take 1 tablet by mouth Every Morning., Disp: 30 tablet, Rfl: 5    No problems with medications.  Refills if needed done    Allergies   Allergen Reactions   • Pertussis Vaccines        Review of Systems  GENERAL:  Active/slower with limits, speed, stamina for age; in college. Sleep is ok. No fever now.  ENDO:  No syncope, near or diaphoretic sweaty spells.  HEENT: No head injury; same occ headache,  No vision change, No significant hearing loss.  Ears without pain/drainage.  No sore throat.  No significant nasal/sinus congestion/drainage. No epistaxis.  CHEST: No chest wall tenderness or mass. No cough, without wheeze.  No SOB; no hemoptysis.  CV: No chest pain, palpitations, ankle edema.  GI: Occ hearturn; no dysphagia.  No  "change occ abdominal pain; no diarrhea, constipation.  No rectal bleeding, or melena.    :  Voids without dysuria, or  incontinence to completion.  ORTHO: No painful/swollen joints but various on /off sore.  No change frequent sore neck or back.  No acute neck or back pain without recent injury.  NEURO: No dizziness, weakness of extremities.  No numbness/paresthesias.   PSYCH: No memory loss.  Mood good; occ anxious, depressed but/and not suicidal.  Tries to tolerate stress .     Results for orders placed or performed during the hospital encounter of 08/04/17   Tissue Pathology Exam   Result Value Ref Range    Case Report       Surgical Pathology Report                         Case: [id number]                                  Authorizing Provider:  Basim Martinez MD       Collected:           08/04/2017 11:26 AM          Ordering Location:     [facility]  Received:            08/04/2017 11:39 AM          Pathologist:           Narinder Dawson MD                                                         Specimen:    Liver, core samples of right lobe of liver                                                 Final Diagnosis       Liver, right lobe, core biopsy:  Severe steatosis.      Gross Description       Specimen #1 is received in a formalin filled container, labeled with the patient's name, date of birth, and \"liver biopsies right lobe\".  The specimen consists of 4 yellow-pink off tissue core biopsies ranging from 0.5 cm in length by less than 0.1 cm in diameter to 1.2 cm in length by less than 0.1 cm in diameter.  The cores are totally submitted in blocks 1A and 1B.      Microscopic Description       Sections demonstrate multiple cores of benign liver parenchyma with severe fatty change predominantly of the macrovesicular type.  The portal areas show no significant inflammation.  The iron stain is negative for increased iron stores.  The trichrome stain fails to reveal significant periportal " "fibrosis.  The reticulin stain highlights the normal palate plate architecture.      Embedded Images         No results found for: HGBA1C    Lab Results   Component Value Date     07/20/2017     03/02/2017    K 4.2 07/20/2017    K 3.9 03/02/2017     07/20/2017     03/02/2017    CO2 29.0 07/20/2017    CO2 28.0 03/02/2017    GLUCOSE 106 (H) 07/20/2017    GLUCOSE 125 (H) 03/02/2017    BUN 11 07/20/2017    BUN 10 03/02/2017    CREATININE 1.07 07/20/2017    CREATININE 1.04 03/02/2017    CALCIUM 9.6 07/20/2017    CALCIUM 9.6 03/02/2017       No results found for: PSA     Lab Results:  CBC:    Lab Results - Last 18 Months  Lab Units 07/20/17  1134 03/02/17  0906   WBC 10*3/mm3 5.60 5.79   HEMATOCRIT % 41.0 42.8     CMP:    Lab Results - Last 18 Months  Lab Units 07/20/17  1134 03/02/17  0906   SODIUM mmol/L 142 141   CHLORIDE mmol/L 102 102   CO2 mmol/L 29.0 28.0   BUN mg/dL 11 10   CREATININE mg/dL 1.07 1.04   EGFR IF NONAFRICN AM mL/min/1.73 86 89   CALCIUM mg/dL 9.6 9.6     THYROID:No results for input(s): TSH, T3FREE, FREET4 in the last 74248 hours.    Invalid input(s): T3, T4  A1C:No results for input(s): HGBA1C in the last 92662 hours.    Objective   /70  Pulse 64  Temp 98.3 °F (36.8 °C) (Oral)   Resp 18  Ht 167.6 cm (66\")  Wt 88.9 kg (196 lb)  SpO2 98%  BMI 31.64 kg/m2    Physical Exam  GENERAL:  Well nourished/developed in no acute distress. Obese; relative  SKIN: Turgor excellent, without wound, rash, lesion.  HEENT: Normal cephalic without trauma.  Pupils equal round reactive to light. Extraocular motions full without nystagmus.   External canals nonobstructive nontender without reddness. Tymphatic membranes rory with corrina structures intact.   Oral cavity without growths, exudates, and moist.  Posterior pharynx without mass, obstruction, redness.  No thyromegaly, mass, tenderness, lymphadenopathy and supple.  CV: Regular rhythm.  No murmur, gallop,  edema. Posterior pulses " intact.  No carotid bruits.  CHEST: No chest wall tenderness or mass.   LUNGS: Symmetric motion with clear to auscultation.  No dullness to percussion  ABD: Soft, nontender without mass.   ORTHO: Symmetric extremities without swelling/point tenderness.  Full gross range of motion.  NEURO: CN 2-12 grossly intact.  Symmetric facies. 1/4 x bicep knee equal reflexes.  UE/LE   4/5 strength throughout.  Nonfocal use extremities. Speech clear. Intact light touch with monofilament, vibratory sensation with tuning fork; equal toes/distal feet.    PSYCH: Oriented x 3.  Pleasant calm, well kept.  Purposeful/directed conservation with intact short/long gross memory.     Assessment/Plan     1. Abnormal LFTs -working dx fatty liver   2. Fatty liver    3. Frequent headaches -suggestive tension/or migraine   4. Obesity, unspecified classification, unspecified obesity type, unspecified whether serious comorbidity present    5. Chronic abdominal pain    6. Chronic back pain, unspecified back location, unspecified back pain laterality -no testing       Rx: reviewed/changes:  same    LAB: reviewed/orders:   Orders Placed This Encounter   Procedures   • MRI thoracic spine w contrast   long term labs important  Extra 6w liver, lipid around 3.8.18  6m CBC, CMP, A1c, LIPID, TSH    Discussions:   More exercise; consider gym    Patient Instructions   Offered MRI mid back  Offered referral to [facility]  Get with dietary.     YOU will get a call on your remaining labs.         Follow up: Return for lab during/or just before next apt;, Dr Layne-, 6 m;.  Future Appointments  Date Time Provider Department Center   7/24/2018 2:30 PM Stone Layne MD [facility] None

## 2018-01-23 NOTE — PATIENT INSTRUCTIONS
Offered MRI mid back  Offered referral to [facility]  Get with dietary.     YOU will get a call on your remaining labs.

## 2018-01-24 ENCOUNTER — HOSPITAL ENCOUNTER (OUTPATIENT)
Dept: HOSPITAL 58 - RAD | Age: 23
Discharge: HOME | End: 2018-01-24
Attending: FAMILY MEDICINE

## 2018-01-24 VITALS — BODY MASS INDEX: 31.4 KG/M2

## 2018-01-24 DIAGNOSIS — M54.6: Primary | ICD-10-CM

## 2018-01-24 DIAGNOSIS — G89.29: ICD-10-CM

## 2018-01-24 RX ORDER — METOPROLOL TARTRATE 100 MG/1
100 TABLET ORAL EVERY 12 HOURS SCHEDULED
Qty: 60 TABLET | Refills: 5
Start: 2018-01-24 | End: 2018-07-24 | Stop reason: SDUPTHER

## 2018-01-24 NOTE — TELEPHONE ENCOUNTER
[facility] called to clarify the Lopressor dose and wanted to know if could be switched to 100 mg bid instead of 50 mg 2 bid verbal ok given to switch

## 2018-01-24 NOTE — PROGRESS NOTES
Wish to ask him try lipitor 10 one a day or 60 days and [facility] liver/lipid 6w into this.   Extra 6w liver, lipid  6m CBC, CMP, A1c, LIPID, TSH

## 2018-01-25 ENCOUNTER — TELEPHONE (OUTPATIENT)
Dept: FAMILY MEDICINE CLINIC | Facility: CLINIC | Age: 23
End: 2018-01-25

## 2018-01-25 DIAGNOSIS — E78.5 HYPERLIPIDEMIA, UNSPECIFIED HYPERLIPIDEMIA TYPE: Primary | ICD-10-CM

## 2018-01-25 RX ORDER — ATORVASTATIN CALCIUM 10 MG/1
10 TABLET, FILM COATED ORAL DAILY
Qty: 30 TABLET | Refills: 1 | Status: SHIPPED | OUTPATIENT
Start: 2018-01-25 | End: 2018-04-05 | Stop reason: SDUPTHER

## 2018-01-25 NOTE — MRI
EXAM:  Thoracic spine MRI with and without contrast. 

  

HISTORY:  Back pain. 

  

COMPARISON:  Chest radiograph 12/28/2007. 

  

TECHNIQUE:  Multiplanar, multisequence MR images were acquired of the thoracic spine without contrast
. 

  

FINDINGS:  12 rib-bearing thoracic vertebra are present.  Conus medullaris ends at T12-L1.  The thora
cic vertebra are normal in height and intrinsic bone marrow signal.  There is minor cervicothoracic l
evoscoliosis centered at T2-3 and there is minor chronic left lateral wedging of T2 and T3 and right 
lateral wedging of T4 that is considered developmental.  No acute compression fractures are present. 
 There is endplate irregularity in the thoracic spine and there are small chronic Schmorl's nodes at 
T5 and from T7 to L2.  Canal diameter is developmentally normal.  However, there is dorsal epidural l
ipomatosis from T3 through T9. In this patient with a developmentally narrow canal, this produces mil
d central canal stenosis from T3-4 through T8-9 and minor stenosis at T9-10.  The thoracic cord is wi
thout syrinx.  Evaluation for abnormal signal intensity is limited by decreased signal-to-noise. 

  

The partially visualized liver, spleen, adrenal glands and kidneys are unremarkable. 

  

T1-2, T2-3:  The intervertebral discs are normal. 

  

T3-4, T4-5:  There is dorsal epidural lipomatosis and in this patient with short pedicles, there is m
ild spinal stenosis and mild bilateral foraminal stenosis. 

  

T5-6, T6-7, T7-8:  The intervertebral discs are normal.  There is dorsal epidural lipomatosis which p
roduces mild central canal stenosis. At T7-8, there is mild left facet hypertrophy. There is mild to 
moderate right and mild left foraminal stenosis. 

  

T8-9:  The intervertebral disc is normal.  There is mild to moderate left and mild right neural ralf
inal stenosis and mild central canal stenosis. 

  

T9-10:  The intervertebral disc is normal.  Bilateral facet hypertrophy is present and there is mild 
to moderate left and mild right foraminal stenosis.  There is minor central canal stenosis. 

  

T10-11:  There is a minor disc bulge without central canal stenosis.  There is mild to moderate bilat
eral foraminal stenosis. 

  

T11-12, T12-L1:  The intervertebral discs are normal.  There is no central canal stenosis or foramina
l stenosis. 

  

IMPRESSION: 

1.  Mild thoracic degenerative spondylosis with chronic Schmorl's nodes at T5 and from T7 to L2. 

2.  Dorsal epidural lipomatosis is present which produces mild central canal stenosis from T3-4 throu
gh T8-9.

## 2018-01-25 NOTE — TELEPHONE ENCOUNTER
Wish to ask him try lipitor 10 one a day or 60 days and [facility] liver/lipid 6w into this.   Extra 6w liver, lipid  6m CBC, CMP, A1c, LIPID, TSH    Pt informed of this. Rx sent to [facility]. To [facility] labs in 6 wks fasting. Will make an appt.

## 2018-01-25 NOTE — TELEPHONE ENCOUNTER
----- Message from Stone Layne MD sent at 1/23/2018  7:32 PM CST -----      ----- Message -----     From: Taty Shelton Rep     Sent: 1/23/2018   4:25 PM       To: Stone Layne MD

## 2018-01-29 DIAGNOSIS — M54.9 CHRONIC BACK PAIN, UNSPECIFIED BACK LOCATION, UNSPECIFIED BACK PAIN LATERALITY: ICD-10-CM

## 2018-01-29 DIAGNOSIS — G89.29 CHRONIC BACK PAIN, UNSPECIFIED BACK LOCATION, UNSPECIFIED BACK PAIN LATERALITY: ICD-10-CM

## 2018-01-29 NOTE — PROGRESS NOTES
Some spinal issues; some mention of nerve compression  Not able from my perspective to tell him if it definitely would be his cause/problems  Happy to refer to a back surgeon if he wants to review.

## 2018-01-30 ENCOUNTER — RESULTS ENCOUNTER (OUTPATIENT)
Dept: FAMILY MEDICINE CLINIC | Facility: CLINIC | Age: 23
End: 2018-01-30

## 2018-01-30 ENCOUNTER — TELEPHONE (OUTPATIENT)
Dept: FAMILY MEDICINE CLINIC | Facility: CLINIC | Age: 23
End: 2018-01-30

## 2018-01-30 DIAGNOSIS — M54.9 CHRONIC BACK PAIN, UNSPECIFIED BACK LOCATION, UNSPECIFIED BACK PAIN LATERALITY: Primary | ICD-10-CM

## 2018-01-30 DIAGNOSIS — G89.29 CHRONIC BACK PAIN, UNSPECIFIED BACK LOCATION, UNSPECIFIED BACK PAIN LATERALITY: Primary | ICD-10-CM

## 2018-01-30 DIAGNOSIS — E78.5 HYPERLIPIDEMIA, UNSPECIFIED HYPERLIPIDEMIA TYPE: ICD-10-CM

## 2018-01-30 NOTE — TELEPHONE ENCOUNTER
----- Message from Neema Kurtz LPN sent at 1/30/2018  8:43 AM CST -----      ----- Message -----     From: Stone Layne MD     Sent: 1/29/2018   2:24 PM       To: [facility]        ----- Message -----     From: Taty Shelton     Sent: 1/29/2018   1:43 PM       To: Stone Layne MD

## 2018-01-30 NOTE — TELEPHONE ENCOUNTER
Some spinal issues; some mention of nerve compression  Not able from my perspective to tell him if it definitely would be his cause/problems  Happy to refer to a back surgeon if he wants to review    Pt says he had already been called about this. Referral has been done.

## 2018-02-15 ENCOUNTER — OFFICE VISIT (OUTPATIENT)
Dept: FAMILY MEDICINE CLINIC | Facility: CLINIC | Age: 23
End: 2018-02-15

## 2018-02-15 ENCOUNTER — TELEPHONE (OUTPATIENT)
Dept: FAMILY MEDICINE CLINIC | Facility: CLINIC | Age: 23
End: 2018-02-15

## 2018-02-15 VITALS
OXYGEN SATURATION: 99 % | SYSTOLIC BLOOD PRESSURE: 168 MMHG | BODY MASS INDEX: 31.18 KG/M2 | DIASTOLIC BLOOD PRESSURE: 110 MMHG | HEIGHT: 66 IN | WEIGHT: 194 LBS | HEART RATE: 92 BPM | RESPIRATION RATE: 16 BRPM | TEMPERATURE: 99 F

## 2018-02-15 DIAGNOSIS — R51.9 CHRONIC NONINTRACTABLE HEADACHE, UNSPECIFIED HEADACHE TYPE: Primary | ICD-10-CM

## 2018-02-15 DIAGNOSIS — I10 HYPERTENSION, UNSPECIFIED TYPE: ICD-10-CM

## 2018-02-15 DIAGNOSIS — R03.0 ELEVATED BLOOD PRESSURE READING: ICD-10-CM

## 2018-02-15 DIAGNOSIS — R51.9 NONINTRACTABLE HEADACHE, UNSPECIFIED CHRONICITY PATTERN, UNSPECIFIED HEADACHE TYPE: ICD-10-CM

## 2018-02-15 DIAGNOSIS — G89.29 CHRONIC NONINTRACTABLE HEADACHE, UNSPECIFIED HEADACHE TYPE: Primary | ICD-10-CM

## 2018-02-15 PROCEDURE — 99213 OFFICE O/P EST LOW 20 MIN: CPT | Performed by: FAMILY MEDICINE

## 2018-02-15 RX ORDER — BUTALBITAL, ACETAMINOPHEN AND CAFFEINE 50; 325; 40 MG/1; MG/1; MG/1
1 TABLET ORAL EVERY 4 HOURS PRN
Qty: 20 TABLET | Refills: 0 | Status: SHIPPED | OUTPATIENT
Start: 2018-02-15 | End: 2018-07-24 | Stop reason: SDUPTHER

## 2018-02-15 NOTE — TELEPHONE ENCOUNTER
Mom called and states that pt has a horrible HA that started last night and his BP was 168/101 now BP is 156/103 and pt does not feel edward self and would like to be seen

## 2018-02-15 NOTE — PATIENT INSTRUCTIONS
Check your blood pressure meds when you get home  If Zestril already twice a day; let us know    Goal for your blood pressure is 130 range top and 80 range bottom and you feeling better. Use us/or home monitoring to prove this.

## 2018-02-15 NOTE — PROGRESS NOTES
Subjective   Mike Rowan is a 22 y.o. male presenting with chief complaint of:   Chief Complaint   Patient presents with   • Hypertension       History of Present Illness :  With grandmother.  Here for primarily an acute issue today; elevated bp and HA.   Has multiple chronic problems to consider that might have a bearing on today's issues; not an interval appointment.       1. Chronic nonintractable headache, unspecified headache type    2. Nonintractable headache, unspecified chronicity pattern, unspecified headache type    3. Hypertension, unspecified type        Other chronic problem/s to consider:  HTN.  The HTN has been present for years/it is chronic.  The HTN is assumed essential/without testing needed to look for other.  The HTN is not controlled manifest by todays blood pressure and same home monitoring.  Associated illness below.   Anxiety: This has been present for years/over a year.  It is chronic.  It is variable.  It is associated with stressors: home.  Medications being used help.   Medications/Rx change not requested.  Fatty liver: This was discovered years ago/it is chronic.  This problem is asymptomatic for pain, nausea.  Previously advised regular labs to follow this and there can be connection with cirrhosis/life threating liver disease.  Advised often helpful to normalize weight.     Has an/another acute issue today: HA; frontal to across.  No head injury.  Recent MRI neg. .    The following portions of the patient's history were reviewed and updated as appropriate: allergies, current medications, past family history, past medical history, past social history, past surgical history and problem list.  Records acquired and reviewed; TCC migrated.      Current Outpatient Prescriptions:   •  amLODIPine (NORVASC) 10 MG tablet, Take 1 tablet by mouth Daily., Disp: 30 tablet, Rfl: 5  •  atorvastatin (LIPITOR) 10 MG tablet, Take 1 tablet by mouth Daily., Disp: 30 tablet, Rfl: 1  •  dicyclomine  (BENTYL) 10 MG capsule, Take 10 mg by mouth 4 (Four) Times a Day Before Meals & at Bedtime., Disp: , Rfl:   •  lisinopril (PRINIVIL,ZESTRIL) 20 MG tablet, Take 1 tablet by mouth Daily., Disp: 30 tablet, Rfl: 5  •  metoprolol tartrate (LOPRESSOR) 100 MG tablet, Take 1 tablet by mouth Every 12 (Twelve) Hours., Disp: 60 tablet, Rfl: 5  •  pantoprazole (PROTONIX) 40 MG EC tablet, Take 1 tablet by mouth Daily., Disp: 30 tablet, Rfl: 11  •  PARoxetine (PAXIL) 10 MG tablet, Take 1 tablet by mouth Every Morning., Disp: 30 tablet, Rfl: 5      No problems with medications.  Refills if needed done    Allergies   Allergen Reactions   • Pertussis Vaccines        Review of Systems  GENERAL:  Active/slower with limits, speed, stami when has HA. Sleep is ok. No fever now.  ENDO:  No syncope, near or diaphoretic sweaty spells.  HEENT: No head injury.  No vision change, No hearing loss.  Ears without pain/drainage.  No sore throat.  No significant nasal/sinus congestion/drainage. No epistaxis.  CHEST: No chest wall tenderness or mass. No cough,  without wheeze.  No SOB; no hemoptysis.  CV: No chest pain, palpitations, ankle edema.  GI: No heartburn, dysphagia.  No abdominal pain, diarrhea, constipation.  No rectal bleeding, or melena.    :  Voids without dysuria, or  incontinence to completion.  ORTHO: No painful/swollen joints but various on /off sore.  No  sore neck or back.  No acute neck or back pain without recent injury.  NEURO: No dizziness, weakness of extremities.  No numbness/paresthesias.   PSYCH: No memory loss.  Mood good; not anxious, depressed but/and not suicidal.  Tries to tolerate stress .     Results for orders placed or performed during the hospital encounter of 08/04/17   Tissue Pathology Exam   Result Value Ref Range    Case Report       Surgical Pathology Report                         Case: [id number]                                  Authorizing Provider:  Basim Martinez MD       Collected:            "08/04/2017 11:26 AM          Ordering Location:     [facility]  Received:            08/04/2017 11:39 AM          Pathologist:           Narinder Dawson MD                                                         Specimen:    Liver, core samples of right lobe of liver                                                 Final Diagnosis       Liver, right lobe, core biopsy:  Severe steatosis.      Gross Description       Specimen #1 is received in a formalin filled container, labeled with the patient's name, date of birth, and \"liver biopsies right lobe\".  The specimen consists of 4 yellow-pink off tissue core biopsies ranging from 0.5 cm in length by less than 0.1 cm in diameter to 1.2 cm in length by less than 0.1 cm in diameter.  The cores are totally submitted in blocks 1A and 1B.      Microscopic Description       Sections demonstrate multiple cores of benign liver parenchyma with severe fatty change predominantly of the macrovesicular type.  The portal areas show no significant inflammation.  The iron stain is negative for increased iron stores.  The trichrome stain fails to reveal significant periportal fibrosis.  The reticulin stain highlights the normal palate plate architecture.      Embedded Images         No results found for: HGBA1C    Lab Results   Component Value Date     07/20/2017     03/02/2017    K 4.2 07/20/2017    K 3.9 03/02/2017     07/20/2017     03/02/2017    CO2 29.0 07/20/2017    CO2 28.0 03/02/2017    GLUCOSE 106 (H) 07/20/2017    GLUCOSE 125 (H) 03/02/2017    BUN 11 07/20/2017    BUN 10 03/02/2017    CREATININE 1.07 07/20/2017    CREATININE 1.04 03/02/2017    CALCIUM 9.6 07/20/2017    CALCIUM 9.6 03/02/2017       No results found for: PSA     Lab Results:  CBC:    Lab Results - Last 18 Months  Lab Units 07/20/17  1134 03/02/17  0906   WBC 10*3/mm3 5.60 5.79   HEMATOCRIT % 41.0 42.8     CMP:    Lab Results - Last 18 Months  Lab Units 07/20/17  1134 " "03/02/17  0906   SODIUM mmol/L 142 141   CHLORIDE mmol/L 102 102   CO2 mmol/L 29.0 28.0   BUN mg/dL 11 10   CREATININE mg/dL 1.07 1.04   EGFR IF NONAFRICN AM mL/min/1.73 86 89   CALCIUM mg/dL 9.6 9.6     THYROID:No results for input(s): TSH, T3FREE, FREET4 in the last 41188 hours.    Invalid input(s): T3, T4  A1C:No results for input(s): HGBA1C in the last 65879 hours.    Objective   BP (!) 168/110 (BP Location: Left arm, Patient Position: Sitting, Cuff Size: Large Adult)  Pulse 92  Temp 99 °F (37.2 °C) (Oral)   Resp 16  Ht 167.6 cm (66\")  Wt 88 kg (194 lb)  SpO2 99%  BMI 31.31 kg/m2    Physical Exam  GENERAL:  Well nourished/developed in no acute distress.   SKIN: Turgor excellent, without wound, rash, lesion.  HEENT: Normal cephalic without trauma.  Pupils equal round reactive to light. Extraocular motions full without nystagmus.   External canals nonobstructive nontender without reddness. Tymphatic membranes rory with corrina structures intact.   Oral cavity without growths, exudates, and moist.  Posterior pharynx without mass, obstruction, redness.  No thyromegaly, mass, tenderness, lymphadenopathy and supple.  CV: Regular rhythm.  No murmur, gallop,  edema.  CHEST: No chest wall tenderness or mass.   LUNGS: Symmetric motion with clear to auscultation.    ABD: Soft, nontender without mass.   ORTHO: Symmetric extremities without swelling/point tenderness.  Full gross range of motion.  NEURO: CN 2-12 grossly intact.  Symmetric facies. 1/4 x bicep knee equal reflexes.  UE/LE   4/5 strength throughout.  Nonfocal use extremities. Speech clear. Intact light touch with monofilament, vibratory sensation with tuning fork; equal toes/distal feet.    PSYCH: Oriented x 3.  Pleasant calm, well kept.  Purposeful/directed conservation with intact short/long gross memory.     Assessment/Plan     1. Chronic nonintractable headache, unspecified headache type    2. Nonintractable headache, unspecified chronicity pattern, " unspecified headache type    3. Elevated blood pressure reading    4. Hypertension, unspecified type        Rx: reviewed/changes:  fiorcet q 4 hr prn  Catapress TTS-1 apply weekly    LAB: reviewed/orders:   none    Discussions:       Patient Instructions   Check your blood pressure meds when you get home  If Zestril already twice a day; let us know    Goal for your blood pressure is 130 range top and 80 range bottom and you feeling better. Use us/or home monitoring to prove this.             Follow up: Return for ro 3-4w.  Future Appointments  Date Time Provider Department Center   3/16/2018 2:45 PM MD [facility] None   7/24/2018 2:30 PM MD [facility] None

## 2018-02-22 ENCOUNTER — TELEPHONE (OUTPATIENT)
Dept: FAMILY MEDICINE CLINIC | Facility: CLINIC | Age: 23
End: 2018-02-22

## 2018-03-14 ENCOUNTER — RESULTS ENCOUNTER (OUTPATIENT)
Dept: FAMILY MEDICINE CLINIC | Facility: CLINIC | Age: 23
End: 2018-03-14

## 2018-03-14 DIAGNOSIS — E78.5 HYPERLIPIDEMIA, UNSPECIFIED HYPERLIPIDEMIA TYPE: ICD-10-CM

## 2018-03-16 ENCOUNTER — OFFICE VISIT (OUTPATIENT)
Dept: FAMILY MEDICINE CLINIC | Facility: CLINIC | Age: 23
End: 2018-03-16

## 2018-03-16 VITALS
OXYGEN SATURATION: 98 % | TEMPERATURE: 99.2 F | RESPIRATION RATE: 16 BRPM | WEIGHT: 196 LBS | SYSTOLIC BLOOD PRESSURE: 144 MMHG | HEART RATE: 82 BPM | DIASTOLIC BLOOD PRESSURE: 98 MMHG | BODY MASS INDEX: 31.5 KG/M2 | HEIGHT: 66 IN

## 2018-03-16 DIAGNOSIS — I10 HYPERTENSION, UNSPECIFIED TYPE: Primary | ICD-10-CM

## 2018-03-16 DIAGNOSIS — K76.0 FATTY LIVER: ICD-10-CM

## 2018-03-16 DIAGNOSIS — E66.9 OBESITY, UNSPECIFIED CLASSIFICATION, UNSPECIFIED OBESITY TYPE, UNSPECIFIED WHETHER SERIOUS COMORBIDITY PRESENT: ICD-10-CM

## 2018-03-16 DIAGNOSIS — R09.89 LABILE BLOOD PRESSURE: ICD-10-CM

## 2018-03-16 PROBLEM — R06.83 SNORING: Status: ACTIVE | Noted: 2018-03-16

## 2018-03-16 PROCEDURE — 99213 OFFICE O/P EST LOW 20 MIN: CPT | Performed by: FAMILY MEDICINE

## 2018-03-16 RX ORDER — SPIRONOLACTONE 25 MG/1
25 TABLET ORAL DAILY
Qty: 30 TABLET | Refills: 1 | Status: SHIPPED | OUTPATIENT
Start: 2018-03-16 | End: 2018-07-24 | Stop reason: SDUPTHER

## 2018-03-16 NOTE — PATIENT INSTRUCTIONS
Goal for your blood pressure is 130 range top and 80 range bottom and you feeling better. Use us/or home monitoring to prove this.

## 2018-03-16 NOTE — PROGRESS NOTES
Subjective   Mike Rowan is a 22 y.o. male presenting with chief complaint of:   Chief Complaint   Patient presents with   • Hypertension       History of Present Illness :  With grandmother.  Here for primarily an f/u to last visit; adjusting bp Rx.   Has multiple chronic problems to consider that might have a bearing on today's issues; not an interval appointment.       Other chronic problem/s to consider:   HTN.  The HTN has been present for years/it is chronic.  The HTN is assumed essential/without testing needed to look for other.  The HTN is better controlled manifest by todays blood pressure and same home monitoring.  Associated illness below.   Fatty liver: This was discovered years ago/it is chronic.  This problem is asymptomatic for pain, nausea.  Previously advised regular labs to follow this and there can be connection with cirrhosis/life threating liver disease.  Advised often helpful to normalize weight.   Obesity/overweight: This has been present for years/over a year.  It is chronic.     It is stable; there has been no recent major change in weight, or dieting  Associated illnesses noted that are affected by this illness.     Has an/another acute issue today: none.    The following portions of the patient's history were reviewed and updated as appropriate: allergies, current medications, past family history, past medical history, past social history, past surgical history and problem list.      Current Outpatient Prescriptions:   •  amLODIPine (NORVASC) 10 MG tablet, Take 1 tablet by mouth Daily., Disp: 30 tablet, Rfl: 5  •  atorvastatin (LIPITOR) 10 MG tablet, Take 1 tablet by mouth Daily., Disp: 30 tablet, Rfl: 1  •  dicyclomine (BENTYL) 10 MG capsule, Take 10 mg by mouth 4 (Four) Times a Day Before Meals & at Bedtime., Disp: , Rfl:   •  lisinopril (PRINIVIL,ZESTRIL) 20 MG tablet, Take 1 tablet by mouth Daily. (Patient taking differently: Take 20 mg by mouth 2 (Two) Times a Day.), Disp: 30  tablet, Rfl: 5  •  metoprolol tartrate (LOPRESSOR) 100 MG tablet, Take 1 tablet by mouth Every 12 (Twelve) Hours., Disp: 60 tablet, Rfl: 5  •  pantoprazole (PROTONIX) 40 MG EC tablet, Take 1 tablet by mouth Daily., Disp: 30 tablet, Rfl: 11  •  PARoxetine (PAXIL) 10 MG tablet, Take 1 tablet by mouth Every Morning., Disp: 30 tablet, Rfl: 5  •  butalbital-acetaminophen-caffeine (FIORICET, ESGIC) -40 MG per tablet, Take 1 tablet by mouth Every 4 (Four) Hours As Needed for Headache., Disp: 20 tablet, Rfl: 0  •  CloNIDine (CATAPRES-TTS) 0.1 MG/24HR patch, Place 1 patch on the skin 1 (One) Time Per Week., Disp: 4 patch, Rfl: 0      No problems with medications.  Refills if needed done    Allergies   Allergen Reactions   • Pertussis Vaccines        Review of Systems  GENERAL:  Active home/school without regular exercise. Sleep is ok; denies apnea.   SKIN: No  rash/skin lesion of concern:   ENDO:  No syncope, near or diaphoretic sweaty spells..   HEENT: No change occ headache,  No vision change,  No epistaxis.  CHEST: No chest wall tenderness or mass. No significant cough, without wheeze.  No SOB; no hemoptysis.  CV: No chest pain, palpitations, ankle edema.  GI: No heartburn, dysphagia.  No abdominal pain, diarrhea, constipation.  No rectal bleeding, or melena.    :  Voids without dysuria, or  incontinence to completion.  ORTHO: No painful/swollen joints but various on /off sore.  No change occ sore neck or back.  No acute neck or back pain without recent injury.  NEURO: No dizziness, weakness of extremities.  No numbness/paresthesias.   PSYCH: No memory loss.  Mood good; not that anxious, depressed but/and not suicidal.  Tries to tolerate stress .     Results for orders placed or performed during the hospital encounter of 08/04/17   Tissue Pathology Exam   Result Value Ref Range    Case Report       Surgical Pathology Report                         Case: [id number]                                  Authorizing  "Provider:  Basim Martinez MD       Collected:           08/04/2017 11:26 AM          Ordering Location:     [facility]  Received:            08/04/2017 11:39 AM          Pathologist:           Narinder Dawson MD                                                         Specimen:    Liver, core samples of right lobe of liver                                                 Final Diagnosis       Liver, right lobe, core biopsy:  Severe steatosis.      Gross Description       Specimen #1 is received in a formalin filled container, labeled with the patient's name, date of birth, and \"liver biopsies right lobe\".  The specimen consists of 4 yellow-pink off tissue core biopsies ranging from 0.5 cm in length by less than 0.1 cm in diameter to 1.2 cm in length by less than 0.1 cm in diameter.  The cores are totally submitted in blocks 1A and 1B.      Microscopic Description       Sections demonstrate multiple cores of benign liver parenchyma with severe fatty change predominantly of the macrovesicular type.  The portal areas show no significant inflammation.  The iron stain is negative for increased iron stores.  The trichrome stain fails to reveal significant periportal fibrosis.  The reticulin stain highlights the normal palate plate architecture.      Embedded Images         No results found for: PSA     Lab Results:  CBC:    Lab Results - Last 18 Months  Lab Units 07/21/17  1027 07/20/17  1134 03/02/17  0906   WBC 10*3/mm3  --  5.60 5.79   HEMOGLOBIN g/dL  --  13.0* 14.3   HEMATOCRIT %  --  41.0 42.8   PLATELETS 10*3/mm3 226 253 253      CMP:    Lab Results - Last 18 Months  Lab Units 07/20/17  1134 03/02/17  0906   SODIUM mmol/L 142 141   CHLORIDE mmol/L 102 102   CO2 mmol/L 29.0 28.0   BUN mg/dL 11 10   CREATININE mg/dL 1.07 1.04   EGFR IF NONAFRICN AM mL/min/1.73 86 89   CALCIUM mg/dL 9.6 9.6     HEPATIC:    Lab Results - Last 18 Months  Lab Units 07/20/17  1134 04/20/17  1034 03/02/17  0906   ALT " "(SGPT) U/L 304* 322* 369*   AST (SGOT) U/L 131* 127* 142*     THYROID:No results for input(s): TSH, T3FREE, FREET4 in the last 75289 hours.    Invalid input(s): T3, T4  A1C:No results for input(s): HGBA1C in the last 17442 hours.  PSA:No results for input(s): PSA in the last 81794 hours.    Objective   /98 (BP Location: Left arm, Patient Position: Sitting, Cuff Size: Large Adult)   Pulse 82   Temp 99.2 °F (37.3 °C) (Oral)   Resp 16   Ht 167.6 cm (66\")   Wt 88.9 kg (196 lb)   SpO2 98%   BMI 31.64 kg/m²   Body mass index is 31.64 kg/m².    Physical Exam  GENERAL:  Well nourished/developed in no acute distress.   SKIN: Turgor excellent, without wound, rash, lesion  HEENT: Normal cephalic without trauma.  Pupils equal round reactive to light. Extraocular motions full without nystagmus.   External canals nonobstructive nontender without reddness. Tymphatic membranes rory with corrina structures intact.   Oral cavity without growths, exudates, and moist.  Posterior pharynx without mass, obstruction, redness.  No thyromegaly, mass, tenderness, lymphadenopathy and supple.  CV: Regular rhythm.  No murmur, gallop,  edema. Posterior pulses intact.  No carotid bruits.  CHEST: No chest wall tenderness or mass.   LUNGS: Symmetric motion with clear to auscultation.   ABD: Soft, nontender without mass.   ORTHO: Symmetric extremities without swelling/point tenderness.  Full gross range of motion.  NEURO: CN 2-12 grossly intact.  Symmetric facies and UE/LE. 4/5 strength throughout. 2/4 x bicep  equal reflexes.  Nonfocal use extremities. Speech clear.     PSYCHOriented x 3.  Pleasant calm, well kept.  Shallow. Purposeful/directed conservation with intact short/long gross memory.     Assessment/Plan     1. Hypertension, unspecified type    2. Labile blood pressure    3. Fatty liver-hx bx    4. Obesity, unspecified classification, unspecified obesity type, unspecified whether serious comorbidity present        Rx: " reviewed/changes:  Add aldactone 25 mg one a day    LAB/Testing/Referrals: reviewed/orders:   Orders Placed This Encounter   Procedures   • Adult Transthoracic Echo Complete W/ Cont if Necessary Per Protocol   K next week      Discussions:   Body mass index is 31.64 kg/m².  Discussed the patient's BMI with him. BMI is above normal parameters. Follow-up plan includes:  exercise counseling and nutrition counseling.  Non-smoker    Patient Instructions   Goal for your blood pressure is 130 range top and 80 range bottom and you feeling better. Use us/or home monitoring to prove this.         Follow up: Return for check potassium next tuesday/wed .  Future Appointments  Date Time Provider Department Center   4/2/2018 8:00 AM PAD ECHO ROOM 2 [facility] PAD CARDI PAD   4/2/2018 9:00 AM PAD US 1 [facility] PAD US PAD   7/24/2018 2:30 PM Stone Layne MD [facility] None

## 2018-03-21 ENCOUNTER — TELEPHONE (OUTPATIENT)
Dept: FAMILY MEDICINE CLINIC | Facility: CLINIC | Age: 23
End: 2018-03-21

## 2018-03-21 ENCOUNTER — TRANSCRIBE ORDERS (OUTPATIENT)
Dept: ADMINISTRATIVE | Facility: HOSPITAL | Age: 23
End: 2018-03-21

## 2018-03-21 DIAGNOSIS — I10 HYPERTENSION, UNSPECIFIED TYPE: Primary | ICD-10-CM

## 2018-03-21 DIAGNOSIS — R09.89 LABILE BLOOD PRESSURE: ICD-10-CM

## 2018-03-21 NOTE — TELEPHONE ENCOUNTER
"Per dw rec communication \"Needs new order to replace VAS23 - Duplex Renal Artery - Bilateral Complete CAR\" as they do not preform any test that has CAR on the end in [place]. Only in [place], will research the correct order    Called per  and corrected test code for [facility] \"US renal Artery complete\" order done and sent to Dr Layne to co-sign test ordered    "

## 2018-03-26 ENCOUNTER — HOSPITAL ENCOUNTER (OUTPATIENT)
Dept: CARDIOLOGY | Facility: HOSPITAL | Age: 23
Discharge: HOME | End: 2018-03-26
Attending: FAMILY MEDICINE

## 2018-03-26 ENCOUNTER — HOSPITAL ENCOUNTER (OUTPATIENT)
Dept: ULTRASOUND IMAGING | Facility: HOSPITAL | Age: 23
Discharge: HOME | End: 2018-03-26
Attending: FAMILY MEDICINE | Admitting: FAMILY MEDICINE

## 2018-03-26 ENCOUNTER — LAB (OUTPATIENT)
Dept: LAB | Facility: HOSPITAL | Age: 23
End: 2018-03-26
Attending: FAMILY MEDICINE

## 2018-03-26 VITALS
BODY MASS INDEX: 31.5 KG/M2 | WEIGHT: 196 LBS | SYSTOLIC BLOOD PRESSURE: 169 MMHG | DIASTOLIC BLOOD PRESSURE: 104 MMHG | HEIGHT: 66 IN

## 2018-03-26 DIAGNOSIS — I10 HYPERTENSION, UNSPECIFIED TYPE: ICD-10-CM

## 2018-03-26 DIAGNOSIS — E78.5 HYPERLIPIDEMIA, UNSPECIFIED HYPERLIPIDEMIA TYPE: Primary | ICD-10-CM

## 2018-03-26 DIAGNOSIS — R09.89 LABILE BLOOD PRESSURE: ICD-10-CM

## 2018-03-26 LAB
ARTICHOKE IGE QN: 120 MG/DL (ref 0–99)
CHOLEST SERPL-MCNC: 187 MG/DL (ref 130–200)
HDLC SERPL-MCNC: 47 MG/DL
LDLC/HDLC SERPL: 2.45 {RATIO}
TRIGL SERPL-MCNC: 125 MG/DL (ref 0–149)

## 2018-03-26 PROCEDURE — 76775 US EXAM ABDO BACK WALL LIM: CPT

## 2018-03-26 PROCEDURE — 80061 LIPID PANEL: CPT | Performed by: FAMILY MEDICINE

## 2018-03-26 PROCEDURE — 93975 VASCULAR STUDY: CPT

## 2018-03-26 PROCEDURE — 36415 COLL VENOUS BLD VENIPUNCTURE: CPT

## 2018-03-26 PROCEDURE — 93306 TTE W/DOPPLER COMPLETE: CPT | Performed by: INTERNAL MEDICINE

## 2018-03-26 PROCEDURE — 93306 TTE W/DOPPLER COMPLETE: CPT

## 2018-03-28 LAB
BH CV ECHO MEAS - AO MAX PG (FULL): 2.3 MMHG
BH CV ECHO MEAS - AO MAX PG: 8.8 MMHG
BH CV ECHO MEAS - AO MEAN PG (FULL): 1 MMHG
BH CV ECHO MEAS - AO MEAN PG: 4 MMHG
BH CV ECHO MEAS - AO ROOT AREA (BSA CORRECTED): 1.3
BH CV ECHO MEAS - AO ROOT AREA: 5.3 CM^2
BH CV ECHO MEAS - AO ROOT DIAM: 2.6 CM
BH CV ECHO MEAS - AO V2 MAX: 148 CM/SEC
BH CV ECHO MEAS - AO V2 MEAN: 90.4 CM/SEC
BH CV ECHO MEAS - AO V2 VTI: 24.4 CM
BH CV ECHO MEAS - AVA(I,A): 2.7 CM^2
BH CV ECHO MEAS - AVA(I,D): 2.7 CM^2
BH CV ECHO MEAS - AVA(V,A): 2.7 CM^2
BH CV ECHO MEAS - AVA(V,D): 2.7 CM^2
BH CV ECHO MEAS - BSA(HAYCOCK): 2.1 M^2
BH CV ECHO MEAS - BSA: 2 M^2
BH CV ECHO MEAS - BZI_BMI: 31.6 KILOGRAMS/M^2
BH CV ECHO MEAS - BZI_METRIC_HEIGHT: 167.6 CM
BH CV ECHO MEAS - BZI_METRIC_WEIGHT: 88.9 KG
BH CV ECHO MEAS - CONTRAST EF 4CH: 53.6 ML/M^2
BH CV ECHO MEAS - EDV(CUBED): 87.5 ML
BH CV ECHO MEAS - EDV(MOD-SP4): 42.7 ML
BH CV ECHO MEAS - EDV(TEICH): 89.6 ML
BH CV ECHO MEAS - EF(CUBED): 63.9 %
BH CV ECHO MEAS - EF(TEICH): 55.7 %
BH CV ECHO MEAS - ESV(CUBED): 31.6 ML
BH CV ECHO MEAS - ESV(MOD-SP4): 19.8 ML
BH CV ECHO MEAS - ESV(TEICH): 39.7 ML
BH CV ECHO MEAS - FS: 28.8 %
BH CV ECHO MEAS - IVS/LVPW: 0.97
BH CV ECHO MEAS - IVSD: 0.93 CM
BH CV ECHO MEAS - LA DIMENSION: 3.2 CM
BH CV ECHO MEAS - LA/AO: 1.2
BH CV ECHO MEAS - LV DIASTOLIC VOL/BSA (35-75): 21.5 ML/M^2
BH CV ECHO MEAS - LV MASS(C)D: 137.9 GRAMS
BH CV ECHO MEAS - LV MASS(C)DI: 69.5 GRAMS/M^2
BH CV ECHO MEAS - LV MAX PG: 6.5 MMHG
BH CV ECHO MEAS - LV MEAN PG: 3 MMHG
BH CV ECHO MEAS - LV SYSTOLIC VOL/BSA (12-30): 10 ML/M^2
BH CV ECHO MEAS - LV V1 MAX: 127 CM/SEC
BH CV ECHO MEAS - LV V1 MEAN: 77.8 CM/SEC
BH CV ECHO MEAS - LV V1 VTI: 21 CM
BH CV ECHO MEAS - LVIDD: 4.4 CM
BH CV ECHO MEAS - LVIDS: 3.2 CM
BH CV ECHO MEAS - LVLD AP4: 8.2 CM
BH CV ECHO MEAS - LVLS AP4: 6.3 CM
BH CV ECHO MEAS - LVOT AREA (M): 3.1 CM^2
BH CV ECHO MEAS - LVOT AREA: 3.1 CM^2
BH CV ECHO MEAS - LVOT DIAM: 2 CM
BH CV ECHO MEAS - LVPWD: 0.95 CM
BH CV ECHO MEAS - MV A MAX VEL: 54.3 CM/SEC
BH CV ECHO MEAS - MV DEC TIME: 0.22 SEC
BH CV ECHO MEAS - MV E MAX VEL: 64.5 CM/SEC
BH CV ECHO MEAS - MV E/A: 1.2
BH CV ECHO MEAS - PA MAX PG: 3.3 MMHG
BH CV ECHO MEAS - PA V2 MAX: 91.2 CM/SEC
BH CV ECHO MEAS - PI END-D VEL: 105 CM/SEC
BH CV ECHO MEAS - RAP SYSTOLE: 5 MMHG
BH CV ECHO MEAS - RVSP: 28.4 MMHG
BH CV ECHO MEAS - SI(AO): 65.3 ML/M^2
BH CV ECHO MEAS - SI(CUBED): 28.2 ML/M^2
BH CV ECHO MEAS - SI(LVOT): 33.3 ML/M^2
BH CV ECHO MEAS - SI(MOD-SP4): 11.5 ML/M^2
BH CV ECHO MEAS - SI(TEICH): 25.1 ML/M^2
BH CV ECHO MEAS - SV(AO): 129.5 ML
BH CV ECHO MEAS - SV(CUBED): 56 ML
BH CV ECHO MEAS - SV(LVOT): 66 ML
BH CV ECHO MEAS - SV(MOD-SP4): 22.9 ML
BH CV ECHO MEAS - SV(TEICH): 49.8 ML
BH CV ECHO MEAS - TR MAX VEL: 242 CM/SEC
E/E' RATIO: 9.1
LEFT ATRIUM VOLUME INDEX: 19 ML/M2
LEFT ATRIUM VOLUME: 37.7 CM3
MAXIMAL PREDICTED HEART RATE: 197 BPM
STRESS TARGET HR: 167 BPM

## 2018-04-02 ENCOUNTER — APPOINTMENT (OUTPATIENT)
Dept: ULTRASOUND IMAGING | Facility: HOSPITAL | Age: 23
End: 2018-04-02
Attending: FAMILY MEDICINE

## 2018-04-02 ENCOUNTER — APPOINTMENT (OUTPATIENT)
Dept: CARDIOLOGY | Facility: HOSPITAL | Age: 23
End: 2018-04-02
Attending: FAMILY MEDICINE

## 2018-04-04 ENCOUNTER — TELEPHONE (OUTPATIENT)
Dept: FAMILY MEDICINE CLINIC | Facility: CLINIC | Age: 23
End: 2018-04-04

## 2018-04-04 PROBLEM — R40.0 DAYTIME SOMNOLENCE: Status: ACTIVE | Noted: 2018-04-04

## 2018-04-04 NOTE — TELEPHONE ENCOUNTER
Mother/father made contact dr jonas with concern   Daytime somulence  Snoring  Large neck    I agree sleep study; I offered it before and patient declined

## 2018-04-05 RX ORDER — ATORVASTATIN CALCIUM 10 MG/1
TABLET, FILM COATED ORAL
Qty: 30 TABLET | Refills: 5 | Status: SHIPPED | OUTPATIENT
Start: 2018-04-05 | End: 2018-07-24 | Stop reason: SDUPTHER

## 2018-04-09 NOTE — TELEPHONE ENCOUNTER
Called patient to ask if he was interested in sleep study and he said he is not at this time. Asked him to call if/when he wants to pursue one and he voiced understanding.  NATHALIA mandel MD

## 2018-05-04 ENCOUNTER — TELEPHONE (OUTPATIENT)
Dept: FAMILY MEDICINE CLINIC | Facility: CLINIC | Age: 23
End: 2018-05-04

## 2018-05-04 RX ORDER — LISINOPRIL 40 MG/1
40 TABLET ORAL DAILY
Qty: 30 TABLET | Refills: 5 | Status: SHIPPED | OUTPATIENT
Start: 2018-05-04 | End: 2018-07-24 | Stop reason: SDUPTHER

## 2018-07-24 ENCOUNTER — OFFICE VISIT (OUTPATIENT)
Dept: FAMILY MEDICINE CLINIC | Facility: CLINIC | Age: 23
End: 2018-07-24

## 2018-07-24 VITALS
HEIGHT: 66 IN | BODY MASS INDEX: 31.18 KG/M2 | SYSTOLIC BLOOD PRESSURE: 136 MMHG | RESPIRATION RATE: 18 BRPM | WEIGHT: 194 LBS | HEART RATE: 60 BPM | OXYGEN SATURATION: 98 % | DIASTOLIC BLOOD PRESSURE: 84 MMHG | TEMPERATURE: 98 F

## 2018-07-24 DIAGNOSIS — G89.29 CHRONIC NONINTRACTABLE HEADACHE, UNSPECIFIED HEADACHE TYPE: ICD-10-CM

## 2018-07-24 DIAGNOSIS — R51.9 CHRONIC NONINTRACTABLE HEADACHE, UNSPECIFIED HEADACHE TYPE: ICD-10-CM

## 2018-07-24 DIAGNOSIS — K76.0 FATTY LIVER: ICD-10-CM

## 2018-07-24 DIAGNOSIS — I10 HYPERTENSION, UNSPECIFIED TYPE: ICD-10-CM

## 2018-07-24 DIAGNOSIS — R79.89 ABNORMAL LFTS: Primary | ICD-10-CM

## 2018-07-24 DIAGNOSIS — E66.9 CLASS 1 OBESITY WITH BODY MASS INDEX (BMI) OF 31.0 TO 31.9 IN ADULT, UNSPECIFIED OBESITY TYPE, UNSPECIFIED WHETHER SERIOUS COMORBIDITY PRESENT: ICD-10-CM

## 2018-07-24 DIAGNOSIS — R03.0 ELEVATED BLOOD PRESSURE READING: ICD-10-CM

## 2018-07-24 DIAGNOSIS — R09.89 LABILE BLOOD PRESSURE: ICD-10-CM

## 2018-07-24 DIAGNOSIS — R51.9 NONINTRACTABLE HEADACHE, UNSPECIFIED CHRONICITY PATTERN, UNSPECIFIED HEADACHE TYPE: ICD-10-CM

## 2018-07-24 DIAGNOSIS — E78.5 HYPERLIPIDEMIA, UNSPECIFIED HYPERLIPIDEMIA TYPE: ICD-10-CM

## 2018-07-24 PROCEDURE — 99214 OFFICE O/P EST MOD 30 MIN: CPT | Performed by: FAMILY MEDICINE

## 2018-07-24 RX ORDER — LISINOPRIL 40 MG/1
40 TABLET ORAL DAILY
Qty: 30 TABLET | Refills: 5 | Status: SHIPPED | OUTPATIENT
Start: 2018-07-24 | End: 2019-11-27 | Stop reason: SDUPTHER

## 2018-07-24 RX ORDER — ATORVASTATIN CALCIUM 10 MG/1
10 TABLET, FILM COATED ORAL DAILY
Qty: 30 TABLET | Refills: 5 | Status: SHIPPED | OUTPATIENT
Start: 2018-07-24 | End: 2019-11-27

## 2018-07-24 RX ORDER — PAROXETINE 10 MG/1
10 TABLET, FILM COATED ORAL EVERY MORNING
Qty: 30 TABLET | Refills: 5 | Status: SHIPPED | OUTPATIENT
Start: 2018-07-24 | End: 2019-11-27 | Stop reason: SDUPTHER

## 2018-07-24 RX ORDER — SPIRONOLACTONE 25 MG/1
25 TABLET ORAL DAILY
Qty: 30 TABLET | Refills: 5 | Status: SHIPPED | OUTPATIENT
Start: 2018-07-24 | End: 2019-11-27

## 2018-07-24 RX ORDER — METOPROLOL TARTRATE 100 MG/1
100 TABLET ORAL EVERY 12 HOURS SCHEDULED
Qty: 60 TABLET | Refills: 5 | Status: SHIPPED | OUTPATIENT
Start: 2018-07-24 | End: 2019-11-27 | Stop reason: SDUPTHER

## 2018-07-24 RX ORDER — DICYCLOMINE HYDROCHLORIDE 10 MG/1
10 CAPSULE ORAL
Qty: 120 CAPSULE | Refills: 5 | Status: SHIPPED | OUTPATIENT
Start: 2018-07-24

## 2018-07-24 RX ORDER — BUTALBITAL, ACETAMINOPHEN AND CAFFEINE 50; 325; 40 MG/1; MG/1; MG/1
1 TABLET ORAL EVERY 4 HOURS PRN
Qty: 20 TABLET | Refills: 0 | Status: SHIPPED | OUTPATIENT
Start: 2018-07-24 | End: 2019-11-27

## 2018-07-24 RX ORDER — AMLODIPINE BESYLATE 10 MG/1
10 TABLET ORAL DAILY
Qty: 30 TABLET | Refills: 5 | Status: SHIPPED | OUTPATIENT
Start: 2018-07-24 | End: 2019-11-27

## 2018-07-24 RX ORDER — PANTOPRAZOLE SODIUM 40 MG/1
40 TABLET, DELAYED RELEASE ORAL DAILY
Qty: 30 TABLET | Refills: 5 | Status: SHIPPED | OUTPATIENT
Start: 2018-07-24 | End: 2019-11-27 | Stop reason: SDUPTHER

## 2018-07-24 NOTE — PROGRESS NOTES
Subjective   Mike Rowan is a 22 y.o. male presenting with chief complaint of:   Chief Complaint   Patient presents with   • Hypertension   • Hyperlipidemia   • fatty liver       History of Present Illness :  With grandmother.   Has multiple chronic problems to consider that might have a bearing on today's issues;  an interval appointment.       Chronic/acute problems to review today:   1. Abnormal LFTs-hx bx: chronic/last lab stable.   Plans to follow twice year labs and every 2-3 yr US and maybe referral to hepatology if needed over time.    2. Hypertension, unspecified type: chronic/stable.  Better with current Rx.    3. Labile blood pressure: chronic/stable; resolved.    4. Chronic nonintractable headache, unspecified headache type: chronic/stable and improved with current Rx  No change in pattern.    5. Nonintractable headache, unspecified chronicity pattern, unspecified headache type: same   6. Elevated blood pressure reading: chronic/resolved   7. Fatty liver-hx bx: chronic/see above   8. Hyperlipidemia, unspecified hyperlipidemia type: chronic/to see if improved with labs after start of lipitor past   9. Class 1 obesity with body mass index (BMI) of 31.0 to 31.9 in adult, unspecified obesity type, unspecified whether serious comorbidity present: chronic/improved as advised weight loss/management.      Has an/another acute issue today: none.    The following portions of the patient's history were reviewed and updated as appropriate: allergies, current medications, past family history, past medical history, past social history, past surgical history and problem list.  Records acquired and reviewed; TCC migrated.      Current Outpatient Prescriptions:   •  amLODIPine (NORVASC) 10 MG tablet, Take 1 tablet by mouth Daily., Disp: 30 tablet, Rfl: 5  •  atorvastatin (LIPITOR) 10 MG tablet, Take 1 tablet by mouth Daily., Disp: 30 tablet, Rfl: 5  •  butalbital-acetaminophen-caffeine (FIORICET, ESGIC) -40  MG per tablet, Take 1 tablet by mouth Every 4 (Four) Hours As Needed for Headache., Disp: 20 tablet, Rfl: 0  •  dicyclomine (BENTYL) 10 MG capsule, Take 1 capsule by mouth 4 (Four) Times a Day Before Meals & at Bedtime., Disp: 120 capsule, Rfl: 5  •  lisinopril (PRINIVIL,ZESTRIL) 40 MG tablet, Take 1 tablet by mouth Daily., Disp: 30 tablet, Rfl: 5  •  metoprolol tartrate (LOPRESSOR) 100 MG tablet, Take 1 tablet by mouth Every 12 (Twelve) Hours., Disp: 60 tablet, Rfl: 5  •  pantoprazole (PROTONIX) 40 MG EC tablet, Take 1 tablet by mouth Daily., Disp: 30 tablet, Rfl: 5  •  PARoxetine (PAXIL) 10 MG tablet, Take 1 tablet by mouth Every Morning., Disp: 30 tablet, Rfl: 5  •  spironolactone (ALDACTONE) 25 MG tablet, Take 1 tablet by mouth Daily., Disp: 30 tablet, Rfl: 5    No problems with medications.  Refills if needed done    Allergies   Allergen Reactions   • Pertussis Vaccines        Review of Systems  GENERAL:  Active home/school without regular exercise. Sleep is ok; denies apnea.   SKIN: No  rash/skin lesion of concern:   ENDO:  No syncope, near or diaphoretic sweaty spells..   HEENT: No change occ headache,  No vision change,  No epistaxis.  CHEST: No chest wall tenderness or mass. No significant cough, without wheeze.  No SOB; no hemoptysis.  CV: No chest pain, palpitations, ankle edema.  GI: No heartburn, dysphagia.  No abdominal pain, diarrhea, constipation.  No rectal bleeding, or melena.    :  Voids without dysuria, or  incontinence to completion.  ORTHO: No painful/swollen joints but various on /off sore.  No change occ sore neck or back.  No acute neck or back pain without recent injury.  NEURO: No dizziness, weakness of extremities.  No numbness/paresthesias.   PSYCH: No memory loss.  Mood good; not that anxious, depressed but/and not suicidal.  Tries to tolerate stress .   Screening:  Mammogram: NA  Bone density: NA  Low dose CT chest: NA  GI:   NA  Prostate: NA  Usual lab order  6m CBC, CMP, A1c,  LIPID, TSH      Results for orders placed or performed during the hospital encounter of 03/26/18   Adult Transthoracic Echo Complete W/ Cont if Necessary Per Protocol   Result Value Ref Range    BSA 2.0 m^2    IVSd 0.93 cm    LVIDd 4.4 cm    LVIDs 3.2 cm    LVPWd 0.95 cm    IVS/LVPW 0.97     FS 28.8 %    EDV(Teich) 89.6 ml    ESV(Teich) 39.7 ml    EF(Teich) 55.7 %    EDV(cubed) 87.5 ml    ESV(cubed) 31.6 ml    EF(cubed) 63.9 %    LV mass(C)d 137.9 grams    LV mass(C)dI 69.5 grams/m^2    SV(Teich) 49.8 ml    SI(Teich) 25.1 ml/m^2    SV(cubed) 56.0 ml    SI(cubed) 28.2 ml/m^2    Ao root diam 2.6 cm    Ao root area 5.3 cm^2    LA dimension 3.2 cm    LA/Ao 1.2     LVOT diam 2.0 cm    LVOT area 3.1 cm^2    LVOT area(traced) 3.1 cm^2    LVLd ap4 8.2 cm    EDV(MOD-sp4) 42.7 ml    LVLs ap4 6.3 cm    ESV(MOD-sp4) 19.8 ml    SV(MOD-sp4) 22.9 ml    SI(MOD-sp4) 11.5 ml/m^2    Ao root area (BSA corrected) 1.3     CONTRAST EF 4CH 53.6 ml/m^2    LV Diastolic corrected for BSA 21.5 ml/m^2    LV Systolic corrected for BSA 10.0 ml/m^2    MV E max steven 64.5 cm/sec    MV A max steven 54.3 cm/sec    MV E/A 1.2     MV dec time 0.22 sec    Ao pk steven 148.0 cm/sec    Ao max PG 8.8 mmHg    Ao max PG (full) 2.3 mmHg    Ao V2 mean 90.4 cm/sec    Ao mean PG 4.0 mmHg    Ao mean PG (full) 1.0 mmHg    Ao V2 VTI 24.4 cm    SANTOSH(I,A) 2.7 cm^2    SANTOSH(I,D) 2.7 cm^2    SANTOSH(V,A) 2.7 cm^2    SANTOSH(V,D) 2.7 cm^2    LV V1 max PG 6.5 mmHg    LV V1 mean PG 3.0 mmHg    LV V1 max 127.0 cm/sec    LV V1 mean 77.8 cm/sec    LV V1 VTI 21.0 cm    SV(Ao) 129.5 ml    SI(Ao) 65.3 ml/m^2    SV(LVOT) 66.0 ml    SI(LVOT) 33.3 ml/m^2    PA V2 max 91.2 cm/sec    PA max PG 3.3 mmHg    PI end-d steven 105.0 cm/sec    TR max steven 242.0 cm/sec    RVSP(TR) 28.4 mmHg    RAP systole 5.0 mmHg     CV ECHO SONA - BZI_BMI 31.6 kilograms/m^2     CV ECHO SONA - BSA(HAYCOCK) 2.1 m^2     CV ECHO SONA - BZI_METRIC_WEIGHT 88.9 kg     CV ECHO SONA - BZI_METRIC_HEIGHT 167.6 cm    Target HR  "(85%) 167 bpm    Max. Pred. HR (100%) 197 bpm    LA volume 37.7 cm3    E/E' ratio 9.1     LA Volume Index 19.0 mL/m2       No results found for: PSA     Lab Results:  CBC:    Lab Results - Last 18 Months  Lab Units 07/21/17  1027 07/20/17  1134 03/02/17  0906   WBC 10*3/mm3  --  5.60 5.79   HEMOGLOBIN g/dL  --  13.0* 14.3   HEMATOCRIT %  --  41.0 42.8   PLATELETS 10*3/mm3 226 253 253   IRON mcg/dL  --   --  83      BMP/CMP:    Lab Results - Last 18 Months  Lab Units 07/20/17  1134 03/02/17  0906   SODIUM mmol/L 142 141   POTASSIUM mmol/L 4.2 3.9   CHLORIDE mmol/L 102 102   CO2 mmol/L 29.0 28.0   BUN mg/dL 11 10   CREATININE mg/dL 1.07 1.04   EGFR IF NONAFRICN AM mL/min/1.73 86 89   CALCIUM mg/dL 9.6 9.6     HEPATIC:    Lab Results - Last 18 Months  Lab Units 07/20/17  1134 04/20/17  1034 03/02/17  0906   ALT (SGPT) U/L 304* 322* 369*   AST (SGOT) U/L 131* 127* 142*   ALK PHOS U/L 75 70 96     THYROID:No results for input(s): TSH, T3FREE, FREET4, FTI in the last 86703 hours.    Invalid input(s): T3, T4, TEUP  A1C:No results for input(s): HGBA1C in the last 70698 hours.  PSA:No results for input(s): PSA in the last 77094 hours.    Objective   /84   Pulse 60   Temp 98 °F (36.7 °C) (Oral)   Resp 18   Ht 167.6 cm (66\")   Wt 88 kg (194 lb)   SpO2 98%   BMI 31.31 kg/m²   Body mass index is 31.31 kg/m².    Physical Exam  GENERAL:  Well nourished/developed in no acute distress.   SKIN: Turgor excellent, without wound, rash, lesion.  HEENT: Normal cephalic without trauma.  Pupils equal round reactive to light. Extraocular motions full without nystagmus.   External canals nonobstructive nontender without reddness. Tymphatic membranes rory with corrina structures intact.   Oral cavity without growths, exudates, and moist.  Posterior pharynx without mass, obstruction, redness.  No thyromegaly, mass, tenderness, lymphadenopathy and supple.  CV: Regular rhythm.  No murmur, gallop,  edema. Posterior pulses intact.  No " carotid bruits.  CHEST: No chest wall tenderness or mass.   LUNGS: Symmetric motion with clear to auscultation.   ABD: Soft, nontender without mass.   ORTHO: Symmetric extremities without swelling/point tenderness.  Full gross range of motion.  NEURO: CN 2-12 grossly intact.  Symmetric facies and UE/LE. 4/5 strength throughout. 1/4 x bicep knee equal reflexes.  Nonfocal use extremities. Speech clear. Intact light touch with monofilament, vibratory sensation with tuning fork; equal toes/distal feet.    PSYCH: Oriented x 3.  Pleasant calm, well kept.  Purposeful/directed conservation with intact short/long gross memory.     Assessment/Plan     1. Abnormal LFTs-hx bx    2. Hypertension, unspecified type    3. Labile blood pressure    4. Chronic nonintractable headache, unspecified headache type    5. Nonintractable headache, unspecified chronicity pattern, unspecified headache type    6. Elevated blood pressure reading    7. Fatty liver-hx bx    8. Hyperlipidemia, unspecified hyperlipidemia type    9. Class 1 obesity with body mass index (BMI) of 31.0 to 31.9 in adult, unspecified obesity type, unspecified whether serious comorbidity present        Rx: reviewed/changes:  same    LAB/Testing/Referrals: reviewed/orders:   Today: fasting at [facility]  Orders Placed This Encounter   Procedures   • Hemoglobin A1c   • Comprehensive Metabolic Panel   • TSH   • Lipid Panel   • CBC & Differential       Discussions:     Body mass index is 31.31 kg/m².   Patient's Body mass index is 31.31 kg/m². BMI is above normal parameters. Recommendations include: exercise counseling, nutrition counseling and advised.  Non-smoker      There are no Patient Instructions on file for this visit.    Follow up: Return for Dr Layne-, 6 m;.  Future Appointments  Date Time Provider Department Center   9/28/2018 8:10 AM LAB [facility] None   1/28/2019 2:00 PM MD [facility] None

## 2018-07-29 ENCOUNTER — RESULTS ENCOUNTER (OUTPATIENT)
Dept: FAMILY MEDICINE CLINIC | Facility: CLINIC | Age: 23
End: 2018-07-29

## 2018-07-29 DIAGNOSIS — K76.0 FATTY LIVER: ICD-10-CM

## 2018-07-29 DIAGNOSIS — R79.89 ABNORMAL LFTS: ICD-10-CM

## 2018-07-29 DIAGNOSIS — R09.89 LABILE BLOOD PRESSURE: ICD-10-CM

## 2018-07-29 DIAGNOSIS — I10 HYPERTENSION, UNSPECIFIED TYPE: ICD-10-CM

## 2018-07-29 DIAGNOSIS — E78.5 HYPERLIPIDEMIA, UNSPECIFIED HYPERLIPIDEMIA TYPE: ICD-10-CM

## 2018-08-03 RX ORDER — DICYCLOMINE HYDROCHLORIDE 10 MG/1
CAPSULE ORAL
Qty: 120 CAPSULE | Refills: 1 | Status: SHIPPED | OUTPATIENT
Start: 2018-08-03 | End: 2019-11-27 | Stop reason: SDUPTHER

## 2018-10-25 ENCOUNTER — HOSPITAL ENCOUNTER (EMERGENCY)
Dept: HOSPITAL 58 - ED | Age: 23
Discharge: HOME | End: 2018-10-25

## 2018-10-25 VITALS — DIASTOLIC BLOOD PRESSURE: 117 MMHG | SYSTOLIC BLOOD PRESSURE: 192 MMHG

## 2018-10-25 VITALS — BODY MASS INDEX: 31.4 KG/M2

## 2018-10-25 VITALS — TEMPERATURE: 98 F

## 2018-10-25 DIAGNOSIS — S86.811A: Primary | ICD-10-CM

## 2018-10-25 DIAGNOSIS — I10: ICD-10-CM
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
## 2018-10-25 PROCEDURE — 99284 EMERGENCY DEPT VISIT MOD MDM: CPT

## 2018-10-25 NOTE — ED.PDOC
General


ED Provider: 


Dr. DEIRDRE DUVAL





Chief Complaint: Fall


Stated Complaint: Rt Knee Pain.  States he was walking at [facility] 

this past weekend when he encountered a soft area on the ground which caused 

him to strain his rt knee. Pain along outer aspect rt knee extending into 

lateral thigh. Denies hip or ankle.


Time Seen by Physician: 11:35


Mode of Arrival: Walk-In


Information Source: Patient


Exam Limitations: No limitations


Primary Care Provider: 


NAVIN SHEA





Nursing and Triage Documentation Reviewed and Agree: Yes


Does patient meet sepsis criteria?: No


System Inflammatory Response Syndrome: Not Applicable


Sepsis Protocol: 


For patient's 13 years and over:





Temp is 96.8 and below  and greater


Pulse >90 BPM


Resp >20/minute


Acutely Altered Mental Status





Are patient's symptoms suggestive of a new infection, such as:


   -Pneumonia


   -Skin, Soft Tissue


   -Endocarditis


   -UTI


   -Bone, Joint Infection


   -Implantable Device


   -Acute Abdominal Infection


   -Wound Infection


   -Meningitis


   -Blood Stream Catheter Infection


   -Unknown








Review of Systems





- Review Of Systems


Constitutional: Reports: No symptoms


Eyes: Reports: No symptoms


Ears, Nose, Mouth, Throat: Reports: No symptoms


Respiratory: Reports: No symptoms


Cardiac: Reports: No symptoms


GI: Reports: No symptoms


: Reports: No symptoms


Musculoskeletal: Reports: No symptoms, Joint pain, Muscle pain


Skin: Reports: No symptoms


Neurological: Reports: No symptoms


Endocrine: Reports: No symptoms


Hematologic/Lymphatic: Reports: No symptoms


All Other Systems: Reviewed and Negative





Past Medical History





- Past Medical History


Previously Healthy: Yes


Endocrine: Reports: None, DM 2 (States his diabetes is not a confirmed diagnosis

), Dyslipidemia, Other (Fatty Liver Disease)


Cardiovascular: Reports: None, Hypertension


Respiratory: Reports: None


Hematological: Reports: None


Gastrointestinal: Reports: None


Genitourinary: Reports: None


Neuro/Psych: Reports: None


Musculoskeletal: Reports: Joint Pain


Cancer: Reports: None





- Surgical History


General Surgical History: Reports: None





- Family History


Family History: Reports: None





- Social History


Smoking Status: Never smoker


Hx Substance Use: No


Alcohol Screening: None





- Immunizations


Tetanus Shot up to Date: No





Physical Exam





- Physical Exam


Appearance: Well-appearing, No pain distress, Well-nourished


Eyes: GABI, EOMI, Conjunctiva clear


ENT: Ears normal, Nose normal, Oropharynx normal


Respiratory: Airway patent, Breath sounds clear, Breath sounds equal, 

Respirations nonlabored


Cardiovascular: RRR, Pulses normal, No rub, No murmur


GI/: Soft, Nontender, No masses, Bowel sounds normal, No Organomegaly


Musculoskeletal: Normal strength (all fields of motion rt knee intact. minimal 

tenderness to testing of Lat Collateral Lig but no laxity; neg ant/post drawer 

sign), ROM intact, No edema, No calf tenderness


Skin: Warm, Dry, Normal color


Neurological: Sensation intact, Motor intact, Reflexes intact, Cranial nerves 

intact, Alert, Oriented


Psychiatric: Affect appropriate, Mood appropriate





Critical Care Note





- Critical Care Note


Total Time (mins): 0





Course





- Course


Orders, Labs, Meds: 


Orders











 Category Date Time Status


 


 ACE [ED ACE WRAP] .ONCE EMERGENCY  10/25/18 12:31 Active


 


 KNEE, RIGHT 4 VIEWS Stat RADS  10/25/18 11:51 Completed











Vital Signs: 


 











  Temp Pulse Resp BP Pulse Ox


 


 10/25/18 12:52     192/117 H 


 


 10/25/18 11:30  98.0 F  109 H  20  203/130 H  98














Departure





- Departure


Time of Disposition: 12:30


Disposition: HOME SELF-CARE


Discharge Problem: 


 Strain of right knee, Labile hypertension





Instructions:  Chronic Hypertension (ED), Knee Pain (ED)


Condition: Good


Pt referred to PMD for follow-up: Yes (Dr Shea in 1 week)


IPMP verified?: No


Additional Instructions: 


Wear ace wrap for support


Ice, Elevate , analgesics for pain control


See Dr Shea in 1 wk


Is discomfort persists additional evaluation may be warranted.


Monitor BP daily at home


See Dr Shea in the office next week 


Prescriptions: 


Ibuprofen 600 mg PO QID #20 tablet


Allergies/Adverse Reactions: 


Allergies





pertussis vaccine,adsorbed [Pertussis Vaccine,Adsorbed] Adverse Reaction (

Unverified 10/25/18 11:32)


 








Home Medications: 


Ambulatory Orders





Cyclobenzaprine HCl 10 mg PO DAILY PRN 01/07/15 


Docusate Sodium [Webster' Laxative] 100 mg PO DAILY PRN 04/07/15 


Dicyclomine HCl [Bentyl] 10 mg PO DAILY 08/18/17 


Ibuprofen 600 mg PO QID #20 tablet 10/25/18 








Disposition Discussed With: Patient





Additional Comments


Additional Comments: Discusse BP issues.  State has had life long issues with 

uncontrolled hypertension which is labile with pain or anxiety.  Cautioned pt 

to monitor at home.  Curently denies visual changesl, headache, dizziness or 

chest pain.  Spoke with DR Shea and made him aware of patients ER visit, 

situation with his BP.  Advised would not treat current BP since coming down, 

have pt follow up with his offic for evaluation and tx. Patient feels his issue 

is due to the  stress of his knee injury and coming to the ER.  Monitor BP 

daily at home

## 2018-10-25 NOTE — DI
EXAM:  Radiographs, right knee 

  

HISTORY:  Initial presentation for right knee trauma. 

  

COMPARISON:  None available. 

  

TECHNIQUE:  Four views. 

  

  

FINDINGS:  Bone mineralization is normal.  There is no fracture or dislocation.  The joint spaces are
 maintained.  No focal soft tissue abnormality is seen. 

  

---------------------------- 

IMPRESSION: 

No fracture or dislocation.

## 2018-11-28 NOTE — TELEPHONE ENCOUNTER
Let pt know so far labs ok  Except triglycerides a little high, lft's still elevated, fatty liver could be th reason for elevated lft's.  rec keep f/u ov and remind him to have lft's drawn again  In 1 month, i put order in computer. Thanks   
done  
59.76

## 2019-01-17 RX ORDER — METOPROLOL TARTRATE 100 MG/1
TABLET ORAL
Qty: 60 TABLET | Refills: 0 | Status: SHIPPED | OUTPATIENT
Start: 2019-01-17 | End: 2019-11-27 | Stop reason: SDUPTHER

## 2019-02-25 RX ORDER — LISINOPRIL 40 MG/1
TABLET ORAL
Qty: 30 TABLET | Refills: 5 | Status: SHIPPED | OUTPATIENT
Start: 2019-02-25 | End: 2019-11-27 | Stop reason: SDUPTHER

## 2019-11-27 ENCOUNTER — OFFICE VISIT (OUTPATIENT)
Dept: FAMILY MEDICINE CLINIC | Facility: CLINIC | Age: 24
End: 2019-11-27

## 2019-11-27 VITALS
BODY MASS INDEX: 29.19 KG/M2 | RESPIRATION RATE: 16 BRPM | HEART RATE: 60 BPM | HEIGHT: 67 IN | WEIGHT: 186 LBS | OXYGEN SATURATION: 96 % | TEMPERATURE: 97.9 F | DIASTOLIC BLOOD PRESSURE: 86 MMHG | SYSTOLIC BLOOD PRESSURE: 160 MMHG

## 2019-11-27 DIAGNOSIS — N20.2 CALCULUS OF KIDNEY WITH CALCULUS OF URETER: Primary | ICD-10-CM

## 2019-11-27 DIAGNOSIS — R10.9 CHRONIC ABDOMINAL PAIN: ICD-10-CM

## 2019-11-27 DIAGNOSIS — G89.29 CHRONIC ABDOMINAL PAIN: ICD-10-CM

## 2019-11-27 DIAGNOSIS — R03.0 ELEVATED BLOOD PRESSURE READING: ICD-10-CM

## 2019-11-27 PROBLEM — N20.9 UROLITHIASIS: Status: ACTIVE | Noted: 2019-11-27

## 2019-11-27 PROCEDURE — 99213 OFFICE O/P EST LOW 20 MIN: CPT | Performed by: FAMILY MEDICINE

## 2019-11-27 RX ORDER — PANTOPRAZOLE SODIUM 40 MG/1
40 TABLET, DELAYED RELEASE ORAL DAILY
Qty: 30 TABLET | Refills: 5 | Status: SHIPPED | OUTPATIENT
Start: 2019-11-27

## 2019-11-27 RX ORDER — AMLODIPINE BESYLATE 10 MG/1
10 TABLET ORAL DAILY
COMMUNITY
End: 2019-11-27 | Stop reason: SDUPTHER

## 2019-11-27 RX ORDER — PAROXETINE 10 MG/1
10 TABLET, FILM COATED ORAL EVERY MORNING
Qty: 30 TABLET | Refills: 5 | Status: SHIPPED | OUTPATIENT
Start: 2019-11-27

## 2019-11-27 RX ORDER — METOPROLOL TARTRATE 100 MG/1
100 TABLET ORAL EVERY 12 HOURS SCHEDULED
Qty: 60 TABLET | Refills: 5 | Status: SHIPPED | OUTPATIENT
Start: 2019-11-27 | End: 2020-07-22

## 2019-11-27 RX ORDER — AMLODIPINE BESYLATE 10 MG/1
10 TABLET ORAL DAILY
Qty: 30 TABLET | Refills: 5 | Status: SHIPPED | OUTPATIENT
Start: 2019-11-27 | End: 2020-07-22

## 2019-11-27 RX ORDER — TAMSULOSIN HYDROCHLORIDE 0.4 MG/1
1 CAPSULE ORAL DAILY
Qty: 30 CAPSULE | Refills: 1 | Status: SHIPPED | OUTPATIENT
Start: 2019-11-27

## 2019-11-27 RX ORDER — LISINOPRIL 40 MG/1
40 TABLET ORAL DAILY
Qty: 30 TABLET | Refills: 5 | Status: SHIPPED | OUTPATIENT
Start: 2019-11-27 | End: 2020-08-28

## 2019-12-17 ENCOUNTER — TELEPHONE (OUTPATIENT)
Dept: FAMILY MEDICINE CLINIC | Facility: CLINIC | Age: 24
End: 2019-12-17

## 2019-12-17 DIAGNOSIS — R30.0 DYSURIA: Primary | ICD-10-CM

## 2019-12-17 DIAGNOSIS — N20.2 CALCULUS OF KIDNEY WITH CALCULUS OF URETER: ICD-10-CM

## 2019-12-17 NOTE — TELEPHONE ENCOUNTER
Spoke to pt mom and order done for UA and will find out id pt can get CT done at  or at [facility] d/t ilini care insurnace?

## 2019-12-17 NOTE — TELEPHONE ENCOUNTER
"Vm \"he seen Dr Layne for a kidney stone and he scheduled him for 6 week f/u and he is still hurting pretty bad. I dont think Dr Layne expected him to hurt that much? He is in pretty bad pain and still urinating every 30 min and hasnt passed any stones and when he strains he passes some clots. Does he need to be seen sooner? I think he may have an infection with him going so much?\"  "

## 2019-12-17 NOTE — TELEPHONE ENCOUNTER
Ct stone protocal  UA/culture as needed    Correct; did not expect this; expected it to be all over

## 2019-12-18 NOTE — TELEPHONE ENCOUNTER
Order done for stat/asap as pt has been having this issue since 11-27-19,for [facility]

## 2019-12-18 NOTE — TELEPHONE ENCOUNTER
Called insurance at 9:03am, explained stat/expidite status, was sent to review and given fax # to send to,  faxed documentation at 9:30am. Talked to MD around 10-10:15am and told him status. He said if insurance wants to mess around and delay, just tell patient to go to ER. Called patient and he states he wanted to wait to see if insurance approved. Rechecked with medical records a couple of times and made sure aware to watch for. Still hadn't received by 2:40pm. Called patient again and explained that we still hadn't received and he can go to ER as stated above. Patient states again that he wants to wait.

## 2019-12-19 LAB
APPEARANCE UR: CLEAR
BACTERIA #/AREA URNS HPF: NORMAL /HPF
BILIRUB UR QL STRIP: NEGATIVE
COLOR UR: YELLOW
EPI CELLS #/AREA URNS HPF: NORMAL /HPF (ref 0–10)
GLUCOSE UR QL: ABNORMAL
HGB UR QL STRIP: ABNORMAL
KETONES UR QL STRIP: NEGATIVE
LEUKOCYTE ESTERASE UR QL STRIP: NEGATIVE
MICRO URNS: ABNORMAL
NITRITE UR QL STRIP: NEGATIVE
PH UR STRIP: 6.5 [PH] (ref 5–7.5)
PROT UR QL STRIP: NEGATIVE
RBC #/AREA URNS HPF: NORMAL /HPF (ref 0–2)
SP GR UR: <=1.005 (ref 1–1.03)
URINALYSIS REFLEX: ABNORMAL
UROBILINOGEN UR STRIP-MCNC: 0.2 MG/DL (ref 0.2–1)
WBC #/AREA URNS HPF: NORMAL /HPF (ref 0–5)

## 2019-12-19 NOTE — TELEPHONE ENCOUNTER
Asked medical records to check faxes and insurance still has not sent approval/denial. Called patient and he said situation unchanged....again reiterated that MD said got to ER if insurance keeps delaying. Patient still wants to wait. Will keep checking.

## 2019-12-20 DIAGNOSIS — N20.2 CALCULUS OF KIDNEY WITH CALCULUS OF URETER: Primary | ICD-10-CM

## 2019-12-23 DIAGNOSIS — N20.2 CALCULUS OF KIDNEY WITH CALCULUS OF URETER: ICD-10-CM

## 2019-12-23 DIAGNOSIS — R30.0 DYSURIA: ICD-10-CM

## 2020-01-06 ENCOUNTER — OFFICE VISIT (OUTPATIENT)
Dept: FAMILY MEDICINE CLINIC | Facility: CLINIC | Age: 25
End: 2020-01-06

## 2020-01-06 VITALS
HEIGHT: 67 IN | TEMPERATURE: 98.2 F | DIASTOLIC BLOOD PRESSURE: 120 MMHG | OXYGEN SATURATION: 94 % | HEART RATE: 63 BPM | BODY MASS INDEX: 29.03 KG/M2 | SYSTOLIC BLOOD PRESSURE: 160 MMHG | RESPIRATION RATE: 18 BRPM | WEIGHT: 185 LBS

## 2020-01-06 DIAGNOSIS — I10 HYPERTENSION, UNSPECIFIED TYPE: ICD-10-CM

## 2020-01-06 DIAGNOSIS — R53.83 FATIGUE, UNSPECIFIED TYPE: Primary | ICD-10-CM

## 2020-01-06 DIAGNOSIS — N20.2 CALCULUS OF KIDNEY WITH CALCULUS OF URETER: ICD-10-CM

## 2020-01-06 PROCEDURE — 99214 OFFICE O/P EST MOD 30 MIN: CPT | Performed by: FAMILY MEDICINE

## 2020-01-06 NOTE — PATIENT INSTRUCTIONS
I encouraged hydration with water, lemonade, and soft drinks that are high in citrate to increase the urine volume as well as the amount of citrate that is in the urine.  I encouraged the patient to try to measure urine to see that they are getting up to 2-3 L per day.  I also explained how color the urine can also be helpful in monitoring.  (60 oz drink each day).   The controversy over calcium restriction is discussed especially in patients with calcium oxalate stones.  Moderation is recommended, but not stopping it.  Protein restriction, sodium restriction, and restriction of oxalate-containing foods is also discussed (google oxalate foods).    ########################    Your blood pressure was above what we really wanted for you today at 160-120.  It is often a problem for blood pressures in the doctor's office to be higher than home (called white coat syndrome).   Goals for your blood pressure are 130-140 range top and 80-90 range bottom and you feeling ok.     We really advise rechecking your blood pressure at home (or with a friend) daily to every other day for the next several days and let us know if your pattern is not the goals above.  If you will do this make sure you control variables that can make your blood pressure show higher than it really is:   A. Use a machine that measures your blood pressure at the upper arm level; not wrist or lower  B. Take off any shirts/garmets and apply the cuff to your bare arm  C. Be sure and rest your arm being used on a table/like so it is totally supported  D. Do not cross your legs while taking your blood pressure  E. Be calm, rested and not upset/anxious in any way while taking your blood pressure  F. Avoid talking while taking the blood pressure  G. Be sure your back is supported and not in a strain while taking your blood pressure.     If you cannot do this at home/with a friend OR want it done here we are happy to make appointments here for that (we only  charge/bill for a nurse visit for doing this).      Please CALL US if your blood pressure stays up as that probably means you have a problem; we likely will adjust things even over the phone.  We want your blood pressure to be normal.     OMRON is a reliable/common home blood automatic blood pressure device that can range around 50-75$.     ########################

## 2020-01-07 LAB
ALBUMIN SERPL-MCNC: 5 G/DL (ref 3.5–5.2)
ALBUMIN/GLOB SERPL: 1.6 G/DL
ALP SERPL-CCNC: 83 U/L (ref 39–117)
ALT SERPL-CCNC: 133 U/L (ref 1–41)
AST SERPL-CCNC: 52 U/L (ref 1–40)
BILIRUB SERPL-MCNC: 0.5 MG/DL (ref 0.2–1.2)
BUN SERPL-MCNC: 10 MG/DL (ref 6–20)
BUN/CREAT SERPL: 7.9 (ref 7–25)
CALCIUM SERPL-MCNC: 9.9 MG/DL (ref 8.6–10.5)
CHLORIDE SERPL-SCNC: 98 MMOL/L (ref 98–107)
CO2 SERPL-SCNC: 29.3 MMOL/L (ref 22–29)
CREAT SERPL-MCNC: 1.26 MG/DL (ref 0.76–1.27)
GLOBULIN SER CALC-MCNC: 3.2 GM/DL
GLUCOSE SERPL-MCNC: 111 MG/DL (ref 65–99)
POTASSIUM SERPL-SCNC: 4.2 MMOL/L (ref 3.5–5.2)
PROT SERPL-MCNC: 8.2 G/DL (ref 6–8.5)
PTH-INTACT SERPL-MCNC: 44 PG/ML (ref 15–65)
SODIUM SERPL-SCNC: 142 MMOL/L (ref 136–145)

## 2020-01-07 NOTE — PROGRESS NOTES
Subjective   Mike Rowan is a 24 y.o. male presenting with chief complaint of:   Chief Complaint   Patient presents with   • History of kidney stone     Pt states he has no problem with stones.       History of Present Illness :  Alone.  Here for primarily an acute issue today; f/u recent passed kidney stone.  Dropped stone here; no lab yet.  No hematuria, flank pain now.  CT showed more stones in kidney.       Has multiple chronic problems to consider that might have a bearing on today's issues; not an interval appointment.       Chronic/acute problems reviewed today:   1. Fatigue, unspecified type: chronic/variable on/off.     2. Calculus of kidney with calculus of ureter: chronic-acute/above   3. Hypertension, unspecified type: Chronic/stable. Stable here/if home blood pressures.  No significant chest pain, SOB, LE edema, orthopnea, near syncope, dizziness/light headness but often tired.        Has an/another acute issue today: none.    The following portions of the patient's history were reviewed and updated as appropriate: allergies, current medications, past family history, past medical history, past social history, past surgical history and problem list.      Current Outpatient Medications:   •  amLODIPine (NORVASC) 10 MG tablet, Take 1 tablet by mouth Daily. Not sure if he is taking it or not, Disp: 30 tablet, Rfl: 5  •  dicyclomine (BENTYL) 10 MG capsule, Take 1 capsule by mouth 4 (Four) Times a Day Before Meals & at Bedtime., Disp: 120 capsule, Rfl: 5  •  lisinopril (PRINIVIL,ZESTRIL) 40 MG tablet, Take 1 tablet by mouth Daily., Disp: 30 tablet, Rfl: 5  •  metoprolol tartrate (LOPRESSOR) 100 MG tablet, Take 1 tablet by mouth Every 12 (Twelve) Hours., Disp: 60 tablet, Rfl: 5  •  pantoprazole (PROTONIX) 40 MG EC tablet, Take 1 tablet by mouth Daily., Disp: 30 tablet, Rfl: 5  •  PARoxetine (PAXIL) 10 MG tablet, Take 1 tablet by mouth Every Morning., Disp: 30 tablet, Rfl: 5  •  tamsulosin (FLOMAX) 0.4 MG  capsule 24 hr capsule, Take 1 capsule by mouth Daily., Disp: 30 capsule, Rfl: 1    No problems with medications.  Refills if needed done    Allergies   Allergen Reactions   • Pertussis Vaccines Other (See Comments)     Family history.       Review of Systems  GENERAL:  Active home/school without regular exercise. Sleep is ok; denies apnea.   SKIN: No rash/skin lesion of concern:   ENDO:  No syncope, near or diaphoretic sweaty spells..   HEENT: No change occ headache.   No vision change,  No epistaxis.  CHEST: No chest wall tenderness or mass. No significant cough, without wheeze.  No SOB; no hemoptysis.  CV: No chest pain, palpitations, ankle edema.  GI: No heartburn, dysphagia.  No abdominal pain, diarrhea, constipation.  No rectal bleeding, or melena.    :  Voids without dysuria, or incontinence to completion; recent hematuria. .  ORTHO: No painful/swollen joints but various on /off sore.  No change occ sore neck or back midline/diffuse.   No acute neck or back pain without recent injury.  NEURO: No dizziness, weakness of extremities.  No numbness/paresthesias.   PSYCH: No memory loss.  Mood good; not that anxious, depressed but/and not suicidal.  Tries to tolerate stress .   Screening:  Mammogram: NA  Bone density: NA  Low dose CT chest: NA  GI:   NA  Prostate: NA  Usual lab order  6m CBC, CMP, A1c, LIPID, TSH    Lab Results:  Results for orders placed or performed in visit on 12/17/19   Urinalysis With Culture If Indicated - Urine, Clean Catch   Result Value Ref Range    Specific Gravity, UA      <=1.005 (A) 1.005 - 1.030    pH, UA 6.5 5.0 - 7.5    Color, UA Yellow Yellow    Appearance, UA Clear Clear    Leukocytes, UA Negative Negative    Protein Negative Negative/Trace    Glucose, UA Trace (A) Negative    Ketones Negative Negative    Blood, UA 1+ (A) Negative    Bilirubin, UA Negative Negative    Urobilinogen, UA 0.2 0.2 - 1.0 mg/dL    Nitrite, UA Negative Negative    Microscopic Examination See below:      "Urinalysis Reflex Comment    Microscopic Examination -   Result Value Ref Range    WBC, UA 0-5 0 - 5 /hpf    RBC, UA 0-2 0 - 2 /hpf    Epithelial Cells (non renal) None seen 0 - 10 /hpf    Bacteria, UA None seen None seen/Few /hpf       A1C:No results for input(s): HGBA1C in the last 46356 hours.  PSA:No results for input(s): PSA in the last 91638 hours.  CBC:No results for input(s): WBC, HGB, HCT, PLT, IRONSERUM, IRON in the last 70137 hours.   BMP/CMP:No results for input(s): NA, K, CL, CO2, GLU, BUN, CREATININE, EGFRIFNONA, EGFRIFAFRI, CALCIUM in the last 95361 hours.  HEPATIC:No results for input(s): ALT, AST, ALKPHOS, TOTAL in the last 51283 hours.  THYROID:No results for input(s): TSH, T3FREE, FTI in the last 91816 hours.    Invalid input(s): T4FREE, T3, T4, TEUP,  TOTALT4    Objective   BP (!) 160/120 (BP Location: Left arm, Patient Position: Sitting, Cuff Size: Adult)   Pulse 63   Temp 98.2 °F (36.8 °C) (Oral)   Resp 18   Ht 170.2 cm (67\")   Wt 83.9 kg (185 lb)   SpO2 94%   BMI 28.98 kg/m²   Body mass index is 28.98 kg/m².    Physical Exam  GENERAL:  Well nourished/developed in no acute distress.   SKIN: Turgor excellent, without wound, rash, lesion.  HEENT: Normal cephalic without trauma.  Pupils equal round reactive to light. Extraocular motions full without nystagmus.   External canals nonobstructive nontender without reddness. Tymphatic membranes rory with corrina structures intact.   Oral cavity without growths, exudates, and moist.  Posterior pharynx without mass, obstruction, redness.  No thyromegaly, mass, tenderness, lymphadenopathy and supple.  CV: Regular rhythm.  No murmur, gallop,  edema. Posterior pulses intact.  No carotid bruits.  CHEST: No chest wall tenderness or mass.   LUNGS: Symmetric motion with clear to auscultation.   ABD: Soft, nontender without mass.   ORTHO: Symmetric extremities without swelling/point tenderness.  Full gross range of motion.  NEURO: CN 2-12 grossly intact. "  Symmetric facies and UE/LE. 4/5 strength throughout. 1/4 x bicep knee equal reflexes.  Nonfocal use extremities. Speech clear. Intact light touch with monofilament, vibratory sensation with tuning fork; equal toes/distal feet.    PSYCH: Oriented x 3.  Pleasant calm, well kept.  Purposeful/directed conservation with intact short/long gross memory.     Assessment/Plan     1. Fatigue, unspecified type    2. Calculus of kidney with calculus of ureter    3. Hypertension, unspecified type      Rx: reviewed/changes:  No orders of the defined types were placed in this encounter.    LAB/Testing/Referrals: reviewed/orders:   Today:   Orders Placed This Encounter   Procedures   • XR Abdomen KUB   • PTH, Intact   • Comprehensive Metabolic Panel     Chronic/recurrent labs above or change to:   same     Discussions:   Causes kidney stones  Can pass one anytime  KUB to see if can still see  Likely fatigue related Rx; but difficult to change anything for bp control  Body mass index is 28.98 kg/m².  Patient's Body mass index is 28.98 kg/m². BMI is within normal parameters. No follow-up required..    Tobacco use reviewed:    Non-smoker  Mike Rowan  reports that he has never smoked. He has never used smokeless tobacco..    Patient Instructions   I encouraged hydration with water, lemonade, and soft drinks that are high in citrate to increase the urine volume as well as the amount of citrate that is in the urine.  I encouraged the patient to try to measure urine to see that they are getting up to 2-3 L per day.  I also explained how color the urine can also be helpful in monitoring.  (60 oz drink each day).   The controversy over calcium restriction is discussed especially in patients with calcium oxalate stones.  Moderation is recommended, but not stopping it.  Protein restriction, sodium restriction, and restriction of oxalate-containing foods is also discussed (google oxalate foods).    ########################    Your blood  pressure was above what we really wanted for you today at 160-120.  It is often a problem for blood pressures in the doctor's office to be higher than home (called white coat syndrome).   Goals for your blood pressure are 130-140 range top and 80-90 range bottom and you feeling ok.     We really advise rechecking your blood pressure at home (or with a friend) daily to every other day for the next several days and let us know if your pattern is not the goals above.  If you will do this make sure you control variables that can make your blood pressure show higher than it really is:   A. Use a machine that measures your blood pressure at the upper arm level; not wrist or lower  B. Take off any shirts/garmets and apply the cuff to your bare arm  C. Be sure and rest your arm being used on a table/like so it is totally supported  D. Do not cross your legs while taking your blood pressure  E. Be calm, rested and not upset/anxious in any way while taking your blood pressure  F. Avoid talking while taking the blood pressure  G. Be sure your back is supported and not in a strain while taking your blood pressure.     If you cannot do this at home/with a friend OR want it done here we are happy to make appointments here for that (we only charge/bill for a nurse visit for doing this).      Please CALL US if your blood pressure stays up as that probably means you have a problem; we likely will adjust things even over the phone.  We want your blood pressure to be normal.     OMRON is a reliable/common home blood automatic blood pressure device that can range around 50-75$.     ########################              Follow up: Return for lab today/, THEN, lab;, Dr Layne-, 6 m;.  Future Appointments   Date Time Provider Department Center   7/6/2020  9:15 AM Stone Layne MD [facility] None

## 2020-01-08 LAB
CA PHOS MFR STONE: 3 %
CALCIUM OXALATE DIHYDRATE MFR STONE IR: 20 %
COLOR STONE: NORMAL
COM MFR STONE: 77 %
COMPN STONE: NORMAL
LABORATORY COMMENT REPORT: NORMAL
LABORATORY COMMENT REPORT: NORMAL
Lab: NORMAL
NIDUS STONE QL: NORMAL
SIZE STONE: NORMAL MM
SURFACE CRYSTALS: NORMAL
WT STONE: 27.1 MG

## 2020-05-15 NOTE — TELEPHONE ENCOUNTER
"Called patient and advised \"I moved up here and requested my records to be sent to dor[facility]s going to school up here. But I dont need any medication\"  "

## 2020-07-22 DIAGNOSIS — R03.0 ELEVATED BLOOD PRESSURE READING: ICD-10-CM

## 2020-07-22 RX ORDER — AMLODIPINE BESYLATE 10 MG/1
TABLET ORAL
Qty: 30 TABLET | Refills: 5 | Status: SHIPPED | OUTPATIENT
Start: 2020-07-22

## 2020-07-22 RX ORDER — METOPROLOL TARTRATE 100 MG/1
TABLET ORAL
Qty: 60 TABLET | Refills: 5 | Status: SHIPPED | OUTPATIENT
Start: 2020-07-22

## 2020-08-28 DIAGNOSIS — R03.0 ELEVATED BLOOD PRESSURE READING: ICD-10-CM

## 2020-08-28 RX ORDER — LISINOPRIL 40 MG/1
40 TABLET ORAL DAILY
Qty: 30 TABLET | Refills: 5 | Status: SHIPPED | OUTPATIENT
Start: 2020-08-28

## 2021-06-01 DIAGNOSIS — R03.0 ELEVATED BLOOD PRESSURE READING: ICD-10-CM

## 2021-06-01 RX ORDER — METOPROLOL TARTRATE 100 MG/1
TABLET ORAL
Qty: 60 TABLET | Refills: 5 | OUTPATIENT
Start: 2021-06-01

## 2021-06-03 DIAGNOSIS — R03.0 ELEVATED BLOOD PRESSURE READING: ICD-10-CM

## 2021-06-03 RX ORDER — AMLODIPINE BESYLATE 10 MG/1
TABLET ORAL
Qty: 30 TABLET | Refills: 5 | OUTPATIENT
Start: 2021-06-03